# Patient Record
Sex: FEMALE | Race: WHITE | NOT HISPANIC OR LATINO | Employment: FULL TIME
[De-identification: names, ages, dates, MRNs, and addresses within clinical notes are randomized per-mention and may not be internally consistent; named-entity substitution may affect disease eponyms.]

---

## 2018-10-08 ENCOUNTER — RECORDS - HEALTHEAST (OUTPATIENT)
Dept: ADMINISTRATIVE | Facility: OTHER | Age: 28
End: 2018-10-08

## 2018-10-08 LAB
HPV SOURCE: NORMAL
HUMAN PAPILLOMA VIRUS 16 DNA: NEGATIVE
HUMAN PAPILLOMA VIRUS 18 DNA: NEGATIVE
HUMAN PAPILLOMA VIRUS FINAL DIAGNOSIS: NORMAL
HUMAN PAPILLOMA VIRUS OTHER HR: NEGATIVE
SPECIMEN DESCRIPTION: NORMAL

## 2018-10-15 ENCOUNTER — RECORDS - HEALTHEAST (OUTPATIENT)
Dept: ADMINISTRATIVE | Facility: OTHER | Age: 28
End: 2018-10-15

## 2019-07-25 ENCOUNTER — TELEPHONE (OUTPATIENT)
Dept: NEUROLOGY | Facility: CLINIC | Age: 29
End: 2019-07-25

## 2019-07-25 NOTE — TELEPHONE ENCOUNTER
AILYN for patient asking her to get records from anywhere she has been treat for her Migraines faxed to us ASAP for appointment 8/12/2019 with Dr. Palma.       Singh CARABALLO/RAYSA

## 2020-01-01 ENCOUNTER — TRANSFERRED RECORDS (OUTPATIENT)
Dept: HEALTH INFORMATION MANAGEMENT | Facility: CLINIC | Age: 30
End: 2020-01-01

## 2020-01-02 ENCOUNTER — AMBULATORY - HEALTHEAST (OUTPATIENT)
Dept: OTHER | Facility: CLINIC | Age: 30
End: 2020-01-02

## 2020-01-02 ENCOUNTER — NURSE TRIAGE (OUTPATIENT)
Dept: NURSING | Facility: CLINIC | Age: 30
End: 2020-01-02

## 2020-01-02 ENCOUNTER — DOCUMENTATION ONLY (OUTPATIENT)
Dept: OTHER | Facility: CLINIC | Age: 30
End: 2020-01-02

## 2020-01-02 NOTE — TELEPHONE ENCOUNTER
"Spouse calling reporting patient was in a sledding accident yesterday and hit her face. Patient was seen in [facility]. CT scan done. Diagnosed with concussion and facial fracture. Patient is not present. Placed call to patient directly. Patient stating her headache is a \"7\" on 1-10 pain scale. Reporting pain improves with Ibuprofen and Tylenol. Stating facial fracture is most painful. Patient is applying ice. Denies change in symptoms since seen in Emergency Room. Reviewed with patient if headache or concussion symptoms increased to return to Emergency Room.    Per guidelines reviewed home care. Caller verbalized understanding. Denies further questions.    Glory Mulligan RN  [facility]         Additional Information    Negative: Weakness (i.e., paralysis, loss of muscle strength) of the face, arm or leg on one side of the body    Negative: Loss of speech or garbled speech    Negative: Difficult to awaken or acting confused (e.g., disoriented, slurred speech)    Negative: Sounds like a life-threatening emergency to the triager    Negative: [1] Black eyes on both sides AND [2] onset within 24 hours of head injury     Patient stating she was advised to expect bruising under both eyes due to facial fracture.    Negative: [1] Concussion suspected AND [2] has not been examined by a HCP    Negative: [1] Mild traumatic brain injury (mTBI; concussion) AND [2] more than 14 days since head injury    Negative: Concussion symptoms are worsening    Negative: [1] Knocked out (unconscious) > 1 minute AND [2] no head CT Scan or MRI has been performed    Negative: [1] Vomiting once or more AND [2] no head CT Scan or MRI has been performed    Negative: [1] Unsteady on feet (e.g., unable to stand or requires support to walk) AND [2] no head CT Scan or MRI has been performed    Negative: [1] Neck pain after dangerous injury (e.g., MVA, diving, trampoline, contact sports, fall > 10 feet or 3 meters) AND [2] no neck " xray has been performed (e.g., c-spine xray or CT)    Negative: Patient sounds very sick or weak to the triager    Negative: [1] SEVERE headache (e.g., excruciating, pain scale 8-10) AND [2] not improved after pain medications    Negative: [1] Concussion symptoms SAME (not worse) AND [2]  taking Coumadin (warfarin) or other strong blood thinner, or known bleeding disorder (e.g., thrombocytopenia)    Negative: [1] Concussion symptoms SAME (not worse) AND [2]  known bleeding disorder (e.g., thrombocytopenia)    Negative: [1] Concussion symptoms staying SAME (not worse) AND [2] age > 60 years    Negative: [1] Concussion symptoms staying SAME (not worse or better) AND [2] present > 3 days    Negative: [1] Concussion symptoms getting BETTER (improving) BUT [2] present > 2 weeks    [1] Concussion symptoms staying SAME  (not worse or better) AND [2] present < 3 days    Protocols used: CONCUSSION (MTBI) LESS THAN 14 DAYS AGO FOLLOW-UP CALL-A-

## 2020-01-07 ENCOUNTER — TRANSFERRED RECORDS (OUTPATIENT)
Dept: HEALTH INFORMATION MANAGEMENT | Facility: CLINIC | Age: 30
End: 2020-01-07

## 2020-01-13 ENCOUNTER — PRE VISIT (OUTPATIENT)
Dept: NEUROLOGY | Facility: CLINIC | Age: 30
End: 2020-01-13

## 2020-01-13 NOTE — TELEPHONE ENCOUNTER
FUTURE VISIT INFORMATION      FUTURE VISIT INFORMATION:    Date: 1/13/2020    Time: 8AM    Location: [facility]  REFERRAL INFORMATION:    Referring provider:  Self     Referring providers clinic:      Reason for visit/diagnosis  [facility]      RECORDS REQUESTED FROM:       Clinic name Comments Records Status Imaging Status   [facility]  Requested  Requested    [facility]   Care Everywhere Requested CT head 1/1/2020

## 2020-01-27 ENCOUNTER — OFFICE VISIT (OUTPATIENT)
Dept: NEUROLOGY | Facility: CLINIC | Age: 30
End: 2020-01-27
Payer: COMMERCIAL

## 2020-01-27 VITALS
BODY MASS INDEX: 22.26 KG/M2 | OXYGEN SATURATION: 97 % | DIASTOLIC BLOOD PRESSURE: 77 MMHG | HEART RATE: 71 BPM | HEIGHT: 62 IN | WEIGHT: 121 LBS | RESPIRATION RATE: 15 BRPM | SYSTOLIC BLOOD PRESSURE: 116 MMHG

## 2020-01-27 DIAGNOSIS — G43.709 CHRONIC MIGRAINE WITHOUT AURA WITHOUT STATUS MIGRAINOSUS, NOT INTRACTABLE: Primary | ICD-10-CM

## 2020-01-27 RX ORDER — TOPIRAMATE 50 MG/1
TABLET, FILM COATED ORAL
COMMUNITY
Start: 2019-12-09 | End: 2020-05-01

## 2020-01-27 RX ORDER — RIZATRIPTAN BENZOATE 10 MG/1
TABLET ORAL
COMMUNITY
Start: 2019-09-07 | End: 2020-02-07

## 2020-01-27 ASSESSMENT — HEADACHE IMPACT TEST (HIT 6)
WHEN YOU HAVE A HEADACHE HOW OFTEN DO YOU WISH YOU COULD LIE DOWN: SOMETIMES
HOW OFTEN HAVE YOU FELT TOO TIRED TO WORK BECAUSE OF YOUR HEADACHES: RARELY
HOW OFTEN DO HEADACHES LIMIT YOUR DAILY ACTIVITIES: VERY OFTEN
HIT6 TOTAL SCORE: 60
HOW OFTEN DID HEADACHS LIMIT CONCENTRATION ON WORK OR DAILY ACTIVITY: SOMETIMES
HOW OFTEN HAVE YOU FELT FED UP OR IRRITATED BECAUSE OF YOUR HEADACHES: SOMETIMES
WHEN YOU HAVE HEADACHES HOW OFTEN IS THE PAIN SEVERE: VERY OFTEN

## 2020-01-27 ASSESSMENT — ENCOUNTER SYMPTOMS
NECK PAIN: 1
STIFFNESS: 0
JOINT SWELLING: 0
ARTHRALGIAS: 0
MUSCLE WEAKNESS: 0
BACK PAIN: 0
MUSCLE CRAMPS: 0
MYALGIAS: 1

## 2020-01-27 ASSESSMENT — MIFFLIN-ST. JEOR: SCORE: 1217.1

## 2020-01-27 ASSESSMENT — PAIN SCALES - GENERAL: PAINLEVEL: NO PAIN (0)

## 2020-01-27 NOTE — LETTER
1/27/2020       RE: Tessa Aly  [address] [id number]     Dear Colleague,    Thank you for referring your patient, Tessa Aly, to the [facility] NEUROLOGY at [facility]. Please see a copy of my visit note below.    [facility]   Clinics and Surgery Center  Neurology Consult     Tessa Aly MRN# [id number]   YOB: 1990 Age: 29 year old     Requesting physician: Dr. Sutherland         Assessment and Recommendations:   Tessa Aly is a 29 year old female who presents to the neurology clinic for further evaluation of headaches.    Her headache presentation is consistent with a long history of episodic migraine without aura, that worsened to become chronic after the birth of her second child 5 years ago.  She also had a recent sledding accident in early January, and her headaches initially increased after this, but have now returned to their previous baseline.  She denies other symptoms of concussion today.  Her neurologic exam is intact today.  Her outside CT scan did not show any intracranial pathology, but did show a right maxillary fracture.    Going forward, we discussed a symptomatic treatment strategy, focusing on prevention of chronic migraine.  -For acute treatment of headache, she would like to continue Excedrin Migraine as needed, not to exceed more than 9 days/month to avoid medication overuse.  -A trial of an alternative triptan could be considered in the future, as rizatriptan is no longer as effective as it used to be.  -For headache prevention, we discussed several options, including a trial of botulinum toxin injections using a chronic migraine protocol, or Emgality monthly subcutaneous injection.  She would like to consider these options and send me a my chart message or phone message.    I spent 45 minutes with the patient, over half of which was counseling.    America Palma MD  Neurology  Pager: [phone]  "           Chief Complaint:   Chief Complaint   Patient presents with     Headache     [facility] NEW HEADACHE- CHRONIC MIGRAINE           History is obtained from the patient and medical record.      Tessa Aly is a 29 year old female who has been suffering from headache attacks since menarche.  Her headaches were occasional initially, and gradually improved over time, until worsening after the birth of her second child 5 years ago.  They have been frequent and severe ever since.  She currently has headaches \"every day to every other day\", and at least 2 or 3 times each week.  On average, she has at least 15 headache days a month for the last few years.    Her headaches are worst on the right, with \"really bad, intense\" retro-orbital pain.  Headache attacks can rate between a 4 and a 9 out of 10, and average about a 7 or 8 out of 10.  When a headache attack occurs, it will last all day.  Her headaches are associated with sensitivity to lights and sounds.  She can also have nausea.  She denies vomiting.  She denies typical aura, positional component to her pain, autonomic features, fevers, or other illness associated with headache.      Of note, earlier this month, she did have a sledding accident resulting in a right maxillary fracture and right supraorbital superficial hematoma.  Her headaches temporarily increased for a few days or weeks, but have since returned to their previous baseline.  She denies other symptoms, including associated balance difficulty, cognitive difficulty, mood changes, sleep difficulty.    When the headache is present, she will sit down on her couch and try to relax, if she can.  She is able to work from home, so rarely has to miss work.    For treatment, she takes Excedrin Migraine, and is found this to be the most effective treatment for her.  She will try not to use this until the headache is an 8 or 9 out of 10, and limits this use to no more than once a week.  She also has rizatriptan 10 mg " available to her, which is no longer effective.    For headache prevention, she currently takes topiramate 50 mg twice a day, which she has been on since August 2019.  This is not currently effective.  In the past, she has tried nortriptyline which caused dry mouth and lost efficacy after a few months.  She is also tried propranolol, which also lost efficacy after a few months.            Past Medical History:   She denies other medical diagnoses.          Past Surgical History:   Tumor removed from left arm          Social History:   She is  and has 2 biological children.  She works as a  at , and sometimes works from home.  Social History     Socioeconomic History     Marital status:      Spouse name: Not on file     Number of children: Not on file     Years of education: Not on file     Highest education level: Not on file   Occupational History     Not on file   Social Needs     Financial resource strain: Not on file     Food insecurity:     Worry: Not on file     Inability: Not on file     Transportation needs:     Medical: Not on file     Non-medical: Not on file   Tobacco Use     Smoking status: Never Smoker     Smokeless tobacco: Never Used   Substance and Sexual Activity     Alcohol use: Not Currently     Drug use: Not on file     Sexual activity: Not on file   Lifestyle     Physical activity:     Days per week: Not on file     Minutes per session: Not on file     Stress: Not on file   Relationships     Social connections:     Talks on phone: Not on file     Gets together: Not on file     Attends Muslim service: Not on file     Active member of club or organization: Not on file     Attends meetings of clubs or organizations: Not on file     Relationship status: Not on file     Intimate partner violence:     Fear of current or ex partner: Not on file     Emotionally abused: Not on file     Physically abused: Not on file     Forced sexual activity: Not on file   Other  "Topics Concern     Not on file   Social History Narrative     Not on file    caffeine one or two sodas daily          Family History:   Maternal aunt with migraines          Allergies:    No Known Allergies          Medications:     Current Outpatient Medications:      aspirin-acetaminophen-caffeine (EXCEDRIN MIGRAINE) 250-250-65 MG tablet, Take 1 tablet by mouth every 6 hours as needed for headaches, Disp: , Rfl:      folic acid 5 MG CAPS, TAKE 1 CAPSULE BY MOUTH EVERY DAY, Disp: , Rfl:      rizatriptan (MAXALT) 10 MG tablet, , Disp: , Rfl:      topiramate (TOPAMAX) 50 MG tablet, , Disp: , Rfl:             Physical Exam:   /77   Pulse 71   Resp 15   Ht 1.575 m (5' 2\")   Wt 54.9 kg (121 lb)   SpO2 97%   BMI 22.13 kg/m      Physical Exam:   General: NAD  Neurologic:   Mental Status Exam: Alert, awake and oriented to situation. No dysarthria. Speech of normal fluency.   Cranial Nerves: Fundoscopic exam with clear disc margins bilaterally. PERRLA, EOMs intact, no nystagmus, facial movements symmetric, facial sensation intact to light touch, hearing intact to conversation, trapezius and SCMs 5/5 bilaterally, tongue midline and fully mobile. No tongue atrophy or fasciculations.    Motor: Normal tone in all four extremities, no atrophy or fasciculations. 5/5 strength bilaterally in shoulder abduction, elbow extensors and flexors, wrist extensors and flexors, hip flexors, knee extensors and flexors, dorsi- and plantarflexion. No tremors or abnormal movements noted.   Sensory: Sensation intact to light touch on arms and legs bilaterally.    Coordination: Finger-nose-finger intact bilaterally.  Rapidly alternating movements intact bilaterally in the upper extremities.  Normal finger tapping bilaterally.  Normal Romberg.   Reflexes: 3+ and symmetric in triceps, biceps, brachioradialis, patellar, Achilles, and plantars downgoing bilaterally.   Gait: Normal gait.  Able to toe and heel walk.  Tandem gait " normal.  Head: Normocephalic, atraumatic. No radiating pain with palpation over the supraorbital notches, occipital nerves.  Temporal pulses intact.  Tenderness to palpation over the right occipital area, from previous contusion.  Neck: Normal range of motion with lateral head movements and neck flexion.  Eyes: No conjunctival injection, no scleral icterus.   Respiratory: No increased work of breathing.  Cardiovascular: No lower extremity edema.  Extremities: Warm, dry.          Data:     CT of the head and facial bones without contrast (January 1, 2020): Per outside report, right frontal scalp contusion, soft tissue contusion superficial to the nasal bones, and acute fracture of the right frontal process of the maxilla and mild buckling    Again, thank you for allowing me to participate in the care of your patient.      Sincerely,    America Palma MD

## 2020-01-27 NOTE — PROGRESS NOTES
[facility]  Neurology Consult     Tessa Aly MRN# [id number]   YOB: 1990 Age: 29 year old     Requesting physician: Dr. Sutherland         Assessment and Recommendations:   Tessa Aly is a 29 year old female who presents to the neurology clinic for further evaluation of headaches.    Her headache presentation is consistent with a long history of episodic migraine without aura, that worsened to become chronic after the birth of her second child 5 years ago.  She also had a recent sledding accident in early January, and her headaches initially increased after this, but have now returned to their previous baseline.  She denies other symptoms of concussion today.  Her neurologic exam is intact today.  Her outside CT scan did not show any intracranial pathology, but did show a right maxillary fracture.    Going forward, we discussed a symptomatic treatment strategy, focusing on prevention of chronic migraine.  -For acute treatment of headache, she would like to continue Excedrin Migraine as needed, not to exceed more than 9 days/month to avoid medication overuse.  -A trial of an alternative triptan could be considered in the future, as rizatriptan is no longer as effective as it used to be.  -For headache prevention, we discussed several options, including a trial of botulinum toxin injections using a chronic migraine protocol, or Emgality monthly subcutaneous injection.  She would like to consider these options and send me a my chart message or phone message.    I spent 45 minutes with the patient, over half of which was counseling.    America Palma MD  Neurology  Pager: [phone]            Chief Complaint:   Chief Complaint   Patient presents with     Headache     [facility] NEW HEADACHE- CHRONIC MIGRAINE           History is obtained from the patient and medical record.      Tessa Aly is a 29 year old female who has been suffering from headache attacks  "since menarche.  Her headaches were occasional initially, and gradually improved over time, until worsening after the birth of her second child 5 years ago.  They have been frequent and severe ever since.  She currently has headaches \"every day to every other day\", and at least 2 or 3 times each week.  On average, she has at least 15 headache days a month for the last few years.    Her headaches are worst on the right, with \"really bad, intense\" retro-orbital pain.  Headache attacks can rate between a 4 and a 9 out of 10, and average about a 7 or 8 out of 10.  When a headache attack occurs, it will last all day.  Her headaches are associated with sensitivity to lights and sounds.  She can also have nausea.  She denies vomiting.  She denies typical aura, positional component to her pain, autonomic features, fevers, or other illness associated with headache.      Of note, earlier this month, she did have a sledding accident resulting in a right maxillary fracture and right supraorbital superficial hematoma.  Her headaches temporarily increased for a few days or weeks, but have since returned to their previous baseline.  She denies other symptoms, including associated balance difficulty, cognitive difficulty, mood changes, sleep difficulty.    When the headache is present, she will sit down on her couch and try to relax, if she can.  She is able to work from home, so rarely has to miss work.    For treatment, she takes Excedrin Migraine, and is found this to be the most effective treatment for her.  She will try not to use this until the headache is an 8 or 9 out of 10, and limits this use to no more than once a week.  She also has rizatriptan 10 mg available to her, which is no longer effective.    For headache prevention, she currently takes topiramate 50 mg twice a day, which she has been on since August 2019.  This is not currently effective.  In the past, she has tried nortriptyline which caused dry mouth and lost " efficacy after a few months.  She is also tried propranolol, which also lost efficacy after a few months.            Past Medical History:   She denies other medical diagnoses.          Past Surgical History:   Tumor removed from left arm          Social History:   She is  and has 2 biological children.  She works as a  at , and sometimes works from home.  Social History     Socioeconomic History     Marital status:      Spouse name: Not on file     Number of children: Not on file     Years of education: Not on file     Highest education level: Not on file   Occupational History     Not on file   Social Needs     Financial resource strain: Not on file     Food insecurity:     Worry: Not on file     Inability: Not on file     Transportation needs:     Medical: Not on file     Non-medical: Not on file   Tobacco Use     Smoking status: Never Smoker     Smokeless tobacco: Never Used   Substance and Sexual Activity     Alcohol use: Not Currently     Drug use: Not on file     Sexual activity: Not on file   Lifestyle     Physical activity:     Days per week: Not on file     Minutes per session: Not on file     Stress: Not on file   Relationships     Social connections:     Talks on phone: Not on file     Gets together: Not on file     Attends Sabianist service: Not on file     Active member of club or organization: Not on file     Attends meetings of clubs or organizations: Not on file     Relationship status: Not on file     Intimate partner violence:     Fear of current or ex partner: Not on file     Emotionally abused: Not on file     Physically abused: Not on file     Forced sexual activity: Not on file   Other Topics Concern     Not on file   Social History Narrative     Not on file    caffeine one or two sodas daily          Family History:   Maternal aunt with migraines          Allergies:    No Known Allergies          Medications:     Current Outpatient Medications:       "aspirin-acetaminophen-caffeine (EXCEDRIN MIGRAINE) 250-250-65 MG tablet, Take 1 tablet by mouth every 6 hours as needed for headaches, Disp: , Rfl:      folic acid 5 MG CAPS, TAKE 1 CAPSULE BY MOUTH EVERY DAY, Disp: , Rfl:      rizatriptan (MAXALT) 10 MG tablet, , Disp: , Rfl:      topiramate (TOPAMAX) 50 MG tablet, , Disp: , Rfl:           Review of Systems:   Answers for HPI/ROS submitted by the patient on 1/27/2020   General Symptoms: No  Skin Symptoms: No  HENT Symptoms: No  EYE SYMPTOMS: No  HEART SYMPTOMS: No  LUNG SYMPTOMS: No  INTESTINAL SYMPTOMS: No  URINARY SYMPTOMS: No  GYNECOLOGIC SYMPTOMS: No  BREAST SYMPTOMS: No  SKELETAL SYMPTOMS: Yes  BLOOD SYMPTOMS: No  NERVOUS SYSTEM SYMPTOMS: No  MENTAL HEALTH SYMPTOMS: No  Back pain: No  Muscle aches: Yes  Neck pain: Yes  Swollen joints: No  Joint pain: No  Bone pain: No  Muscle cramps: No  Muscle weakness: No  Joint stiffness: No  Bone fracture: Yes          Physical Exam:   /77   Pulse 71   Resp 15   Ht 1.575 m (5' 2\")   Wt 54.9 kg (121 lb)   SpO2 97%   BMI 22.13 kg/m     Physical Exam:   General: NAD  Neurologic:   Mental Status Exam: Alert, awake and oriented to situation. No dysarthria. Speech of normal fluency.   Cranial Nerves: Fundoscopic exam with clear disc margins bilaterally. PERRLA, EOMs intact, no nystagmus, facial movements symmetric, facial sensation intact to light touch, hearing intact to conversation, trapezius and SCMs 5/5 bilaterally, tongue midline and fully mobile. No tongue atrophy or fasciculations.    Motor: Normal tone in all four extremities, no atrophy or fasciculations. 5/5 strength bilaterally in shoulder abduction, elbow extensors and flexors, wrist extensors and flexors, hip flexors, knee extensors and flexors, dorsi- and plantarflexion. No tremors or abnormal movements noted.   Sensory: Sensation intact to light touch on arms and legs bilaterally.    Coordination: Finger-nose-finger intact bilaterally.  Rapidly " alternating movements intact bilaterally in the upper extremities.  Normal finger tapping bilaterally.  Normal Romberg.   Reflexes: 3+ and symmetric in triceps, biceps, brachioradialis, patellar, Achilles, and plantars downgoing bilaterally.   Gait: Normal gait.  Able to toe and heel walk.  Tandem gait normal.  Head: Normocephalic, atraumatic. No radiating pain with palpation over the supraorbital notches, occipital nerves.  Temporal pulses intact.  Tenderness to palpation over the right occipital area, from previous contusion.  Neck: Normal range of motion with lateral head movements and neck flexion.  Eyes: No conjunctival injection, no scleral icterus.   Respiratory: No increased work of breathing.  Cardiovascular: No lower extremity edema.  Extremities: Warm, dry.          Data:     CT of the head and facial bones without contrast (January 1, 2020): Per outside report, right frontal scalp contusion, soft tissue contusion superficial to the nasal bones, and acute fracture of the right frontal process of the maxilla and mild buckling

## 2020-01-30 DIAGNOSIS — G43.709 CHRONIC MIGRAINE WITHOUT AURA WITHOUT STATUS MIGRAINOSUS, NOT INTRACTABLE: Primary | ICD-10-CM

## 2020-02-07 DIAGNOSIS — G43.709 CHRONIC MIGRAINE WITHOUT AURA WITHOUT STATUS MIGRAINOSUS, NOT INTRACTABLE: Primary | ICD-10-CM

## 2020-02-07 RX ORDER — RIZATRIPTAN BENZOATE 10 MG/1
10 TABLET ORAL
Qty: 18 TABLET | Refills: 3 | Status: SHIPPED | OUTPATIENT
Start: 2020-02-07 | End: 2020-06-08

## 2020-03-09 ENCOUNTER — OFFICE VISIT (OUTPATIENT)
Dept: NEUROLOGY | Facility: CLINIC | Age: 30
End: 2020-03-09
Payer: COMMERCIAL

## 2020-03-09 DIAGNOSIS — G43.709 CHRONIC MIGRAINE WITHOUT AURA WITHOUT STATUS MIGRAINOSUS, NOT INTRACTABLE: Primary | ICD-10-CM

## 2020-03-09 ASSESSMENT — HEADACHE IMPACT TEST (HIT 6)
HOW OFTEN HAVE YOU FELT TOO TIRED TO WORK BECAUSE OF YOUR HEADACHES: SOMETIMES
HOW OFTEN HAVE YOU FELT FED UP OR IRRITATED BECAUSE OF YOUR HEADACHES: VERY OFTEN
HIT6 TOTAL SCORE: 65
WHEN YOU HAVE HEADACHES HOW OFTEN IS THE PAIN SEVERE: VERY OFTEN
HOW OFTEN DID HEADACHS LIMIT CONCENTRATION ON WORK OR DAILY ACTIVITY: VERY OFTEN
WHEN YOU HAVE A HEADACHE HOW OFTEN DO YOU WISH YOU COULD LIE DOWN: VERY OFTEN
HOW OFTEN DO HEADACHES LIMIT YOUR DAILY ACTIVITIES: VERY OFTEN

## 2020-03-09 NOTE — LETTER
3/9/2020       RE: Tessa Aly  [address] [facility] [address]     Dear Colleague,    Thank you for referring your patient, Tessa Aly, to the [facility] NEUROLOGY at [facility]. Please see a copy of my visit note below.    [place], [place]  Botulinum Toxin Procedure    America Palma MD  Neurology    March 9, 2020    Procedure:  OnabotulinumtoxinA injections for chronic migraine  Indication:  Chronic migraine    Ms. Aly suffers from severe intractable headaches.  She was referred by Dr. Palma for onabotulinumtoxinA injections for headache.  Risks, benefits, and alternatives were discussed.  All questions were answered and consent given.  She decided to proceed with the injections.     Prior to initiation of botulinum toxin injections, Ms. Aly reported 15-30 headache days per month, with 15-30 severe headache days per month. Her headaches are quite disabling and often interfere with her ability to function normally.    For headache prevention, she currently takes topiramate 50 mg twice a day, which she has been on since August 2019.  This is not currently effective.  In the past, she has tried nortriptyline which caused dry mouth and lost efficacy after a few months.  She is also tried propranolol, which also lost efficacy after a few months.    She currently takes topiramate for prevention.    Ms. Aly's pain was assessed prior to the procedure.  She rated her pain today as 4 out of 10.    Procedural Pause: Procedural pause was conducted to verify correct patient identity, procedure to be performed, correct side and site, correct patient position, and special requirements. Appropriate hand hygiene was utilized, and each injection site was prepped with alcohol wipes or Chloraprep swab.     Procedure Details: From lot number [id number], expiration date August 2022, 200 units of onabotulinumtoxinA was diluted in 4 mL 0.9% normal saline,  lot #-[id number], expiration date March 1, 2021. A total of 150 units of onabotulinumtoxinA were injected using 30 gauge 0.5 in needles into the muscles listed below. 50 units of onabotulinumtoxinA were wasted.     Injection Sites: Total = 150 units onabotulinumtoxinA      and Procerus muscles - 5 units into the left and right corrugators and 5 units into the procerus (15 units total)    Frontalis muscles - 5 units into the left superior frontalis and 5 unites into the right superior frontalis (2 injection sites per muscle) (10 units total)    Temporalis muscles - 12.5 units into the left temporalis muscle and 12.5 units into the right temporalis muscle (2 injection sites per muscle) (25 units total)    Occipitalis muscles - 12.5 units into the left occipitalis muscle and 12.5 units into the right occipitalis muscle (2 injection sites per muscle) (25 units total)    Splenius Capitis muscles - 12.5 units into the left splenius capitis muscle and 12.5 units into the right splenius capitis muscle (2 injection sites per muscle, divided into 2/3 anteriorly and 1/3 posteriorly) (25 units total)      Trapezius muscles - 25 units into the left trapezius muscle and 25 units into the right trapezius muscle (3 injection sites per muscle, divided 5 units, 10 units, 10 units, medial to  lateral) (50 units total)    Ms. Aly tolerated the procedure well without immediate complications.  She will follow up in clinic for assessment of the effectiveness of treatment.  She did not report any change in her pain level after the botulinumtoxinA injection procedure.    America Palma MD  Pager: [phone]    Neurology Department  [facility]  [address] [place] [id number]

## 2020-03-09 NOTE — PROGRESS NOTES
[place], [place]  Botulinum Toxin Procedure    America Palma MD  Neurology    March 9, 2020    Procedure:  OnabotulinumtoxinA injections for chronic migraine  Indication:  Chronic migraine    Ms. Aly suffers from severe intractable headaches.  She was referred by Dr. Palma for onabotulinumtoxinA injections for headache.  Risks, benefits, and alternatives were discussed.  All questions were answered and consent given.  She decided to proceed with the injections.     Prior to initiation of botulinum toxin injections, Ms. Aly reported 15-30 headache days per month, with 15-30 severe headache days per month. Her headaches are quite disabling and often interfere with her ability to function normally.    For headache prevention, she currently takes topiramate 50 mg twice a day, which she has been on since August 2019.  This is not currently effective.  In the past, she has tried nortriptyline which caused dry mouth and lost efficacy after a few months.  She is also tried propranolol, which also lost efficacy after a few months.    She currently takes topiramate for prevention.    Ms. Aly's pain was assessed prior to the procedure.  She rated her pain today as 4 out of 10.    Procedural Pause: Procedural pause was conducted to verify correct patient identity, procedure to be performed, correct side and site, correct patient position, and special requirements. Appropriate hand hygiene was utilized, and each injection site was prepped with alcohol wipes or Chloraprep swab.     Procedure Details: From lot number [id number], expiration date August 2022, 200 units of onabotulinumtoxinA was diluted in 4 mL 0.9% normal saline, lot #-[id number], expiration date March 1, 2021. A total of 150 units of onabotulinumtoxinA were injected using 30 gauge 0.5 in needles into the muscles listed below. 50 units of onabotulinumtoxinA were wasted.     Injection Sites: Total = 150 units onabotulinumtoxinA       and Procerus muscles - 5 units into the left and right corrugators and 5 units into the procerus (15 units total)    Frontalis muscles - 5 units into the left superior frontalis and 5 unites into the right superior frontalis (2 injection sites per muscle) (10 units total)    Temporalis muscles - 12.5 units into the left temporalis muscle and 12.5 units into the right temporalis muscle (2 injection sites per muscle) (25 units total)    Occipitalis muscles - 12.5 units into the left occipitalis muscle and 12.5 units into the right occipitalis muscle (2 injection sites per muscle) (25 units total)    Splenius Capitis muscles - 12.5 units into the left splenius capitis muscle and 12.5 units into the right splenius capitis muscle (2 injection sites per muscle, divided into 2/3 anteriorly and 1/3 posteriorly) (25 units total)      Trapezius muscles - 25 units into the left trapezius muscle and 25 units into the right trapezius muscle (3 injection sites per muscle, divided 5 units, 10 units, 10 units, medial to  lateral) (50 units total)    Ms. Aly tolerated the procedure well without immediate complications.  She will follow up in clinic for assessment of the effectiveness of treatment.  She did not report any change in her pain level after the botulinumtoxinA injection procedure.    America Palma MD  Pager: [phone]    Neurology Department  [facility]  Clinics and Surgery [address] [address]

## 2020-03-11 ENCOUNTER — HEALTH MAINTENANCE LETTER (OUTPATIENT)
Age: 30
End: 2020-03-11

## 2020-05-01 DIAGNOSIS — G43.709 CHRONIC MIGRAINE WITHOUT AURA WITHOUT STATUS MIGRAINOSUS, NOT INTRACTABLE: Primary | ICD-10-CM

## 2020-05-01 RX ORDER — TOPIRAMATE 50 MG/1
50 TABLET, FILM COATED ORAL 2 TIMES DAILY
Qty: 60 TABLET | Refills: 11 | Status: SHIPPED | OUTPATIENT
Start: 2020-05-01 | End: 2021-03-24

## 2020-06-08 ENCOUNTER — OFFICE VISIT (OUTPATIENT)
Dept: NEUROLOGY | Facility: CLINIC | Age: 30
End: 2020-06-08
Payer: COMMERCIAL

## 2020-06-08 DIAGNOSIS — G43.709 CHRONIC MIGRAINE WITHOUT AURA WITHOUT STATUS MIGRAINOSUS, NOT INTRACTABLE: Primary | ICD-10-CM

## 2020-06-08 RX ORDER — RIZATRIPTAN BENZOATE 10 MG/1
10 TABLET ORAL
Qty: 18 TABLET | Refills: 11 | Status: SHIPPED | OUTPATIENT
Start: 2020-06-08 | End: 2021-03-24

## 2020-06-08 ASSESSMENT — HEADACHE IMPACT TEST (HIT 6)
WHEN YOU HAVE A HEADACHE HOW OFTEN DO YOU WISH YOU COULD LIE DOWN: VERY OFTEN
HIT6 TOTAL SCORE: 66
HOW OFTEN DO HEADACHES LIMIT YOUR DAILY ACTIVITIES: VERY OFTEN
HOW OFTEN HAVE YOU FELT TOO TIRED TO WORK BECAUSE OF YOUR HEADACHES: VERY OFTEN
HOW OFTEN HAVE YOU FELT FED UP OR IRRITATED BECAUSE OF YOUR HEADACHES: VERY OFTEN
WHEN YOU HAVE HEADACHES HOW OFTEN IS THE PAIN SEVERE: VERY OFTEN
HOW OFTEN DID HEADACHS LIMIT CONCENTRATION ON WORK OR DAILY ACTIVITY: VERY OFTEN

## 2020-06-08 NOTE — LETTER
6/8/2020       RE: Tessa Aly  [address] [facility] [address]     Dear Colleague,    Thank you for referring your patient, Tessa Aly, to the [facility] NEUROLOGY at [facility]. Please see a copy of my visit note below.    [place], [place]  Botulinum Toxin Procedure    America Palma MD  Neurology    June 8, 2020    Procedure:  OnabotulinumtoxinA injections for chronic migraine  Indication:  Chronic migraine    Ms. Aly suffers from severe intractable headaches.  She was referred by Dr. Palma for onabotulinumtoxinA injections for headache.  Risks, benefits, and alternatives were discussed.  All questions were answered and consent given.  She decided to proceed with the injections.     Prior to initiation of botulinum toxin injections, Ms. Aly reported 15-30 headache days per month, with 15-30 severe headache days per month. Her headaches are quite disabling and often interfere with her ability to function normally.    Ms. Aly reports 4 headache days per month currently, with 4 severe headache days per month.  She has noticed a wearing off phenomenon prior to this round of botulinum toxin injections, lasting 4 weeks.    For headache prevention, she currently takes topiramate 50 mg twice a day, which she has been on since August 2019.  In the past, she has tried nortriptyline which caused dry mouth and lost efficacy after a few months.  She also tried propranolol, which also lost efficacy after a few months.     She currently takes topiramate for prevention.    Ms. Aly's pain was assessed prior to the procedure.  She rated her pain today as 3 out of 10.    Procedural Pause: Procedural pause was conducted to verify correct patient identity, procedure to be performed, correct side and site, correct patient position, and special requirements. Appropriate hand hygiene was utilized, and each injection site was prepped with alcohol wipes  or Chloraprep swab.     Procedure Details: From lot number [id number], expiration date 11/2022, 200 units of onabotulinumtoxinA was diluted in 4 mL 0.9% normal saline, lot # -[id number]. A total of 150 units of onabotulinumtoxinA were injected using 30 gauge 0.5 in needles into the muscles listed below. 50 units of onabotulinumtoxinA were wasted.     Injection Sites: Total = 150 units onabotulinumtoxinA      and Procerus muscles - 5 units into the left and right corrugators and 5 units into the procerus (15 units total)    Frontalis muscles - 5 units into the left superior frontalis and 5 units into the right superior frontalis (2 injection sites per muscle) (10 units total)    Temporalis muscles - 12.5 units into the left temporalis muscle and 12.5 units into the right temporalis muscle (2 injection sites per muscle) (25 units total)    Occipitalis muscles - 12.5 units into the left occipitalis muscle and 12.5 units into the right occipitalis muscle (2 injection sites per muscle) (25 units total)    Splenius Capitis muscles - 12.5 units into the left splenius capitis muscle and 12.5 units into the right splenius capitis muscle (2 injection sites per muscle, divided into 2/3 anteriorly and 1/3 posteriorly) (25 units total)      Trapezius muscles - 25 units into the left trapezius muscle and 25 units into the right trapezius muscle (3 injection sites per muscle, divided 5 units, 10 units, 10 units, medial to lateral) (50 units total)    Ms. Aly tolerated the procedure well without immediate complications.  She will follow up in clinic for assessment of the effectiveness of treatment.  She did not report any change in her pain level after the botulinumtoxinA injection procedure.    America Palma MD  Pager: [phone]    Neurology Department  [facility] [address] [address]

## 2020-06-08 NOTE — PROGRESS NOTES
[place], [place]  Botulinum Toxin Procedure    America Palma MD  Neurology    June 8, 2020    Procedure:  OnabotulinumtoxinA injections for chronic migraine  Indication:  Chronic migraine    Ms. Aly suffers from severe intractable headaches.  She was referred by Dr. Palma for onabotulinumtoxinA injections for headache.  Risks, benefits, and alternatives were discussed.  All questions were answered and consent given.  She decided to proceed with the injections.     Prior to initiation of botulinum toxin injections, Ms. Aly reported 15-30 headache days per month, with 15-30 severe headache days per month. Her headaches are quite disabling and often interfere with her ability to function normally.    Ms. Aly reports 4 headache days per month currently, with 4 severe headache days per month.  She has noticed a wearing off phenomenon prior to this round of botulinum toxin injections, lasting 4 weeks.    For headache prevention, she currently takes topiramate 50 mg twice a day, which she has been on since August 2019.  In the past, she has tried nortriptyline which caused dry mouth and lost efficacy after a few months.  She also tried propranolol, which also lost efficacy after a few months.     She currently takes topiramate for prevention.    Ms. Aly's pain was assessed prior to the procedure.  She rated her pain today as 3 out of 10.    Procedural Pause: Procedural pause was conducted to verify correct patient identity, procedure to be performed, correct side and site, correct patient position, and special requirements. Appropriate hand hygiene was utilized, and each injection site was prepped with alcohol wipes or Chloraprep swab.     Procedure Details: From lot number [id number], expiration date 11/2022, 200 units of onabotulinumtoxinA was diluted in 4 mL 0.9% normal saline, lot # -[id number]. A total of 150 units of onabotulinumtoxinA were injected using 30 gauge 0.5 in needles into  the muscles listed below. 50 units of onabotulinumtoxinA were wasted.     Injection Sites: Total = 150 units onabotulinumtoxinA      and Procerus muscles - 5 units into the left and right corrugators and 5 units into the procerus (15 units total)    Frontalis muscles - 5 units into the left superior frontalis and 5 units into the right superior frontalis (2 injection sites per muscle) (10 units total)    Temporalis muscles - 12.5 units into the left temporalis muscle and 12.5 units into the right temporalis muscle (2 injection sites per muscle) (25 units total)    Occipitalis muscles - 12.5 units into the left occipitalis muscle and 12.5 units into the right occipitalis muscle (2 injection sites per muscle) (25 units total)    Splenius Capitis muscles - 12.5 units into the left splenius capitis muscle and 12.5 units into the right splenius capitis muscle (2 injection sites per muscle, divided into 2/3 anteriorly and 1/3 posteriorly) (25 units total)      Trapezius muscles - 25 units into the left trapezius muscle and 25 units into the right trapezius muscle (3 injection sites per muscle, divided 5 units, 10 units, 10 units, medial to lateral) (50 units total)    Ms. Aly tolerated the procedure well without immediate complications.  She will follow up in clinic for assessment of the effectiveness of treatment.  She did not report any change in her pain level after the botulinumtoxinA injection procedure.    America Palma MD  Pager: [phone]    Neurology Department  [facility] [place] [id number]

## 2020-09-14 ENCOUNTER — OFFICE VISIT (OUTPATIENT)
Dept: NEUROLOGY | Facility: CLINIC | Age: 30
End: 2020-09-14
Payer: COMMERCIAL

## 2020-09-14 DIAGNOSIS — G43.709 CHRONIC MIGRAINE WITHOUT AURA WITHOUT STATUS MIGRAINOSUS, NOT INTRACTABLE: Primary | ICD-10-CM

## 2020-09-14 ASSESSMENT — HEADACHE IMPACT TEST (HIT 6)
HIT6 TOTAL SCORE: 68
HOW OFTEN DO HEADACHES LIMIT YOUR DAILY ACTIVITIES: VERY OFTEN
WHEN YOU HAVE A HEADACHE HOW OFTEN DO YOU WISH YOU COULD LIE DOWN: VERY OFTEN
HOW OFTEN HAVE YOU FELT TOO TIRED TO WORK BECAUSE OF YOUR HEADACHES: VERY OFTEN
HOW OFTEN HAVE YOU FELT FED UP OR IRRITATED BECAUSE OF YOUR HEADACHES: ALWAYS
WHEN YOU HAVE HEADACHES HOW OFTEN IS THE PAIN SEVERE: VERY OFTEN
HOW OFTEN DID HEADACHS LIMIT CONCENTRATION ON WORK OR DAILY ACTIVITY: VERY OFTEN

## 2020-09-14 NOTE — PROGRESS NOTES
[place], [place]  Botulinum Toxin Procedure    America Palma MD  Neurology    September 14, 2020    Procedure:  OnabotulinumtoxinA injections for chronic migraine  Indication:  Chronic migraine    HIT-6: 68    Ms. Aly suffers from severe intractable headaches.  She was referred by Dr. Palma for onabotulinumtoxinA injections for headache.  Risks, benefits, and alternatives were discussed.  All questions were answered and consent given.  She decided to proceed with the injections.     Prior to initiation of botulinum toxin injections, Ms. Aly reported 15-30 headache days per month, with 15-30 severe headache days per month. Her headaches are quite disabling and often interfere with her ability to function normally.    Ms. Aly reports 15 headache days per month currently, with 12 severe headache days per month.  She has noticed a wearing off phenomenon prior to this round of botulinum toxin injections, lasting a few weeks.    Menstrual period and stress are triggers.     For headache prevention, she currently takes topiramate 50 mg twice a day, which she has been on since August 2019.  In the past, she has tried nortriptyline which caused dry mouth and lost efficacy after a few months.  She also tried propranolol, which also lost efficacy after a few months.     She currently takes topiramate for prevention.     Ms. Aly's pain was assessed prior to the procedure.  She rated her pain today as 7 out of 10.    Procedural Pause: Procedural pause was conducted to verify correct patient identity, procedure to be performed, correct side and site, correct patient position, and special requirements. Appropriate hand hygiene was utilized, and each injection site was prepped with alcohol wipes or Chloraprep swab.     Procedure Details: From lot number [id number], expiration date 5/2023, 200 units of onabotulinumtoxinA was diluted in 4 mL 0.9% normal saline, lot # [id number]. A total of 150 units of  onabotulinumtoxinA were injected using 30 gauge 0.5 in needles into the muscles listed below. 50 units of onabotulinumtoxinA were wasted.     Injection Sites: Total = 150 units onabotulinumtoxinA      and Procerus muscles - 5 units into the left and right corrugators and 5 units into the procerus (15 units total)    Frontalis muscles - 5 units into the left superior frontalis and 5 units into the right superior frontalis (2 injection sites per muscle) (10 units total)    Temporalis muscles - 12.5 units into the left temporalis muscle and 12.5 units into the right temporalis muscle (2 injection sites per muscle) (25 units total)    Occipitalis muscles - 12.5 units into the left occipitalis muscle and 12.5 units into the right occipitalis muscle (2 injection sites per muscle) (25 units total)    Splenius Capitis muscles - 12.5 units into the left splenius capitis muscle and 12.5 units into the right splenius capitis muscle (2 injection sites per muscle, divided into 2/3 anteriorly and 1/3 posteriorly) (25 units total)      Trapezius muscles - 25 units into the left trapezius muscle and 25 units into the right trapezius muscle (3 injection sites per muscle, divided 5 units, 10 units, 10 units, medial to lateral) (50 units total)    Ms. Aly tolerated the procedure well without immediate complications.  She will follow up in clinic for assessment of the effectiveness of treatment.  She did not report any change in her pain level after the botulinumtoxinA injection procedure.    America Palma MD  Pager: [phone]    Neurology Department  [facility] [address] [address]

## 2020-09-14 NOTE — LETTER
9/14/2020       RE: Tessa Aly  [address] [facility] [address]     Dear Colleague,    Thank you for referring your patient, Tessa Aly, to the [facility] NEUROLOGY at [facility]. Please see a copy of my visit note below.    [place], [place]  Botulinum Toxin Procedure    America Palma MD  Neurology    September 14, 2020    Procedure:  OnabotulinumtoxinA injections for chronic migraine  Indication:  Chronic migraine    HIT-6: 68    Ms. Aly suffers from severe intractable headaches.  She was referred by Dr. Palma for onabotulinumtoxinA injections for headache.  Risks, benefits, and alternatives were discussed.  All questions were answered and consent given.  She decided to proceed with the injections.     Prior to initiation of botulinum toxin injections, Ms. Aly reported 15-30 headache days per month, with 15-30 severe headache days per month. Her headaches are quite disabling and often interfere with her ability to function normally.    Ms. Aly reports 15 headache days per month currently, with 12 severe headache days per month.  She has noticed a wearing off phenomenon prior to this round of botulinum toxin injections, lasting a few weeks.    Menstrual period and stress are triggers.     For headache prevention, she currently takes topiramate 50 mg twice a day, which she has been on since August 2019.  In the past, she has tried nortriptyline which caused dry mouth and lost efficacy after a few months.  She also tried propranolol, which also lost efficacy after a few months.     She currently takes topiramate for prevention.     Ms. Aly's pain was assessed prior to the procedure.  She rated her pain today as 7 out of 10.    Procedural Pause: Procedural pause was conducted to verify correct patient identity, procedure to be performed, correct side and site, correct patient position, and special requirements. Appropriate hand  hygiene was utilized, and each injection site was prepped with alcohol wipes or Chloraprep swab.     Procedure Details: From lot number [id number], expiration date 5/2023, 200 units of onabotulinumtoxinA was diluted in 4 mL 0.9% normal saline, lot # [id number]. A total of 150 units of onabotulinumtoxinA were injected using 30 gauge 0.5 in needles into the muscles listed below. 50 units of onabotulinumtoxinA were wasted.     Injection Sites: Total = 150 units onabotulinumtoxinA      and Procerus muscles - 5 units into the left and right corrugators and 5 units into the procerus (15 units total)    Frontalis muscles - 5 units into the left superior frontalis and 5 units into the right superior frontalis (2 injection sites per muscle) (10 units total)    Temporalis muscles - 12.5 units into the left temporalis muscle and 12.5 units into the right temporalis muscle (2 injection sites per muscle) (25 units total)    Occipitalis muscles - 12.5 units into the left occipitalis muscle and 12.5 units into the right occipitalis muscle (2 injection sites per muscle) (25 units total)    Splenius Capitis muscles - 12.5 units into the left splenius capitis muscle and 12.5 units into the right splenius capitis muscle (2 injection sites per muscle, divided into 2/3 anteriorly and 1/3 posteriorly) (25 units total)      Trapezius muscles - 25 units into the left trapezius muscle and 25 units into the right trapezius muscle (3 injection sites per muscle, divided 5 units, 10 units, 10 units, medial to lateral) (50 units total)    Ms. Aly tolerated the procedure well without immediate complications.  She will follow up in clinic for assessment of the effectiveness of treatment.  She did not report any change in her pain level after the botulinumtoxinA injection procedure.    America Palma MD  Pager: [phone]    Neurology Department  [facility] [place]  [address] [place] [address]  [id number]      Again, thank you for allowing me to participate in the care of your patient.      Sincerely,    America Palma MD

## 2020-09-14 NOTE — LETTER
9/14/2020       RE: Tessa Aly  [id number] [facility] [address]     Dear Colleague,    Thank you for referring your patient, Tessa Aly, to the [facility] NEUROLOGY at [facility]. Please see a copy of my visit note below.    [place], [place]  Botulinum Toxin Procedure    America Palma MD  Neurology    September 14, 2020    Procedure:  OnabotulinumtoxinA injections for chronic migraine  Indication:  Chronic migraine    HIT-6: 68    Ms. Aly suffers from severe intractable headaches.  She was referred by Dr. Palma for onabotulinumtoxinA injections for headache.  Risks, benefits, and alternatives were discussed.  All questions were answered and consent given.  She decided to proceed with the injections.     Prior to initiation of botulinum toxin injections, Ms. Aly reported 15-30 headache days per month, with 15-30 severe headache days per month. Her headaches are quite disabling and often interfere with her ability to function normally.    Ms. Aly reports 15 headache days per month currently, with 12 severe headache days per month.  She has noticed a wearing off phenomenon prior to this round of botulinum toxin injections, lasting a few weeks.    Menstrual period and stress are triggers.     For headache prevention, she currently takes topiramate 50 mg twice a day, which she has been on since August 2019.  In the past, she has tried nortriptyline which caused dry mouth and lost efficacy after a few months.  She also tried propranolol, which also lost efficacy after a few months.     She currently takes topiramate for prevention.     Ms. Aly's pain was assessed prior to the procedure.  She rated her pain today as 7 out of 10.    Procedural Pause: Procedural pause was conducted to verify correct patient identity, procedure to be performed, correct side and site, correct patient position, and special requirements. Appropriate hand  hygiene was utilized, and each injection site was prepped with alcohol wipes or Chloraprep swab.     Procedure Details: From lot number [id number], expiration date 5/2023, 200 units of onabotulinumtoxinA was diluted in 4 mL 0.9% normal saline, lot # [id number]. A total of 150 units of onabotulinumtoxinA were injected using 30 gauge 0.5 in needles into the muscles listed below. 50 units of onabotulinumtoxinA were wasted.     Injection Sites: Total = 150 units onabotulinumtoxinA      and Procerus muscles - 5 units into the left and right corrugators and 5 units into the procerus (15 units total)    Frontalis muscles - 5 units into the left superior frontalis and 5 units into the right superior frontalis (2 injection sites per muscle) (10 units total)    Temporalis muscles - 12.5 units into the left temporalis muscle and 12.5 units into the right temporalis muscle (2 injection sites per muscle) (25 units total)    Occipitalis muscles - 12.5 units into the left occipitalis muscle and 12.5 units into the right occipitalis muscle (2 injection sites per muscle) (25 units total)    Splenius Capitis muscles - 12.5 units into the left splenius capitis muscle and 12.5 units into the right splenius capitis muscle (2 injection sites per muscle, divided into 2/3 anteriorly and 1/3 posteriorly) (25 units total)      Trapezius muscles - 25 units into the left trapezius muscle and 25 units into the right trapezius muscle (3 injection sites per muscle, divided 5 units, 10 units, 10 units, medial to lateral) (50 units total)    Ms. Aly tolerated the procedure well without immediate complications.  She will follow up in clinic for assessment of the effectiveness of treatment.  She did not report any change in her pain level after the botulinumtoxinA injection procedure.    Again, thank you for allowing me to participate in the care of your patient.  Sincerely,    America Palma MD  Pager: [phone]

## 2021-01-03 ENCOUNTER — HEALTH MAINTENANCE LETTER (OUTPATIENT)
Age: 31
End: 2021-01-03

## 2021-01-04 ENCOUNTER — OFFICE VISIT (OUTPATIENT)
Dept: NEUROLOGY | Facility: CLINIC | Age: 31
End: 2021-01-04
Payer: COMMERCIAL

## 2021-01-04 DIAGNOSIS — G43.709 CHRONIC MIGRAINE WITHOUT AURA WITHOUT STATUS MIGRAINOSUS, NOT INTRACTABLE: Primary | ICD-10-CM

## 2021-01-04 PROCEDURE — 64615 CHEMODENERV MUSC MIGRAINE: CPT | Performed by: PSYCHIATRY & NEUROLOGY

## 2021-01-04 ASSESSMENT — HEADACHE IMPACT TEST (HIT 6)
HOW OFTEN HAVE YOU FELT TOO TIRED TO WORK BECAUSE OF YOUR HEADACHES: SOMETIMES
WHEN YOU HAVE A HEADACHE HOW OFTEN DO YOU WISH YOU COULD LIE DOWN: ALWAYS
WHEN YOU HAVE HEADACHES HOW OFTEN IS THE PAIN SEVERE: VERY OFTEN
HIT6 TOTAL SCORE: 65
HOW OFTEN DID HEADACHS LIMIT CONCENTRATION ON WORK OR DAILY ACTIVITY: SOMETIMES
HOW OFTEN DO HEADACHES LIMIT YOUR DAILY ACTIVITIES: SOMETIMES
HOW OFTEN HAVE YOU FELT FED UP OR IRRITATED BECAUSE OF YOUR HEADACHES: VERY OFTEN

## 2021-01-04 NOTE — PROGRESS NOTES
[place], [place]  Botulinum Toxin Procedure    America Palma MD  Neurology    January 4, 2021    Procedure:  OnabotulinumtoxinA injections for chronic migraine  Indication:  Chronic migraine    Ms. Aly suffers from severe intractable headaches.  She was referred by Dr. Palma for onabotulinumtoxinA injections for headache.  Risks, benefits, and alternatives were discussed.  All questions were answered and consent given.  She decided to proceed with the injections.     Prior to initiation of botulinum toxin injections, Ms. Aly reported 15-30 headache days per month, with 15-30 severe headache days per month. Her headaches are quite disabling and often interfere with her ability to function normally.    Ms. Aly reports 9-14 headache days per month currently, with 9-14 severe headache days per month.  She has noticed a wearing off phenomenon prior to this round of botulinum toxin injections, lasting a few weeks.    For headache prevention, she currently takes topiramate 50 mg twice a day, which she has been on since August 2019.  In the past, she has tried nortriptyline which caused dry mouth and lost efficacy after a few months.  She also tried propranolol, which also lost efficacy after a few months.     She currently takes topiramate for prevention.     Ms. Aly's pain was assessed prior to the procedure.  She rated her pain today as 3 out of 10.    Procedural Pause: Procedural pause was conducted to verify correct patient identity, procedure to be performed, correct side and site, correct patient position, and special requirements. Appropriate hand hygiene was utilized, and each injection site was prepped with alcohol wipes or Chloraprep swab.     Procedure Details: From lot number [id number], expiration date 9/2023, 200 units of onabotulinumtoxinA was diluted in 4 mL 0.9% normal saline. A total of 200 units of onabotulinumtoxinA were injected using 30 gauge 0.5 in needles into the  muscles listed below. 0 units of onabotulinumtoxinA were wasted.     Injection Sites: Total = 200 units onabotulinumtoxinA      and Procerus muscles - 5 units into the left and right corrugators and 5 units into the procerus (15 units total)    Frontalis muscles - 5 units into the left superior frontalis and 5 units into the right superior frontalis (2 injection sites per muscle) (10 units total)    Temporalis muscles - 25 units into the left temporalis muscle and 25 units into the right temporalis muscle (3 injection sites per muscle) (50 units total)    Occipitalis muscles - 25 units into the left occipitalis muscle and 25 units into the right occipitalis muscle (3 injection sites per muscle) (50 units total)    Splenius Capitis muscles - 12.5 units into the left splenius capitis muscle and 12.5 units into the right splenius capitis muscle (2 injection sites per muscle, divided into 2/3 anteriorly and 1/3 posteriorly) (25 units total)      Trapezius muscles - 25 units into the left trapezius muscle and 25 units into the right trapezius muscle (3 injection sites per muscle, divided 5 units, 10 units, 10 units, medial to lateral) (50 units total)    Ms. Aly tolerated the procedure well without immediate complications.  She will follow up in clinic for assessment of the effectiveness of treatment.  She did not report any change in her pain level after the botulinumtoxinA injection procedure.    America Palma MD  Pager: [phone]    Neurology Department  [facility] [address] [address]

## 2021-01-04 NOTE — LETTER
1/4/2021       RE: Tessa Aly  [address] [facility] [address]     Dear Colleague,    Thank you for referring your patient, Tessa Aly, to the [facility] NEUROLOGY CLINIC [place] at [facility]. Please see a copy of my visit note below.    [place], [place]  Botulinum Toxin Procedure    America Palma MD  Neurology    January 4, 2021    Procedure:  OnabotulinumtoxinA injections for chronic migraine  Indication:  Chronic migraine    Ms. Aly suffers from severe intractable headaches.  She was referred by Dr. Palma for onabotulinumtoxinA injections for headache.  Risks, benefits, and alternatives were discussed.  All questions were answered and consent given.  She decided to proceed with the injections.     Prior to initiation of botulinum toxin injections, Ms. Aly reported 15-30 headache days per month, with 15-30 severe headache days per month. Her headaches are quite disabling and often interfere with her ability to function normally.    Ms. Aly reports 9-14 headache days per month currently, with 9-14 severe headache days per month.  She has noticed a wearing off phenomenon prior to this round of botulinum toxin injections, lasting a few weeks.    For headache prevention, she currently takes topiramate 50 mg twice a day, which she has been on since August 2019.  In the past, she has tried nortriptyline which caused dry mouth and lost efficacy after a few months.  She also tried propranolol, which also lost efficacy after a few months.     She currently takes topiramate for prevention.     Ms. Aly's pain was assessed prior to the procedure.  She rated her pain today as 3 out of 10.    Procedural Pause: Procedural pause was conducted to verify correct patient identity, procedure to be performed, correct side and site, correct patient position, and special requirements. Appropriate hand hygiene was utilized, and each  injection site was prepped with alcohol wipes or Chloraprep swab.     Procedure Details: From lot number [id number], expiration date 9/2023, 200 units of onabotulinumtoxinA was diluted in 4 mL 0.9% normal saline. A total of 200 units of onabotulinumtoxinA were injected using 30 gauge 0.5 in needles into the muscles listed below. 0 units of onabotulinumtoxinA were wasted.     Injection Sites: Total = 200 units onabotulinumtoxinA      and Procerus muscles - 5 units into the left and right corrugators and 5 units into the procerus (15 units total)    Frontalis muscles - 5 units into the left superior frontalis and 5 units into the right superior frontalis (2 injection sites per muscle) (10 units total)    Temporalis muscles - 25 units into the left temporalis muscle and 25 units into the right temporalis muscle (3 injection sites per muscle) (50 units total)    Occipitalis muscles - 25 units into the left occipitalis muscle and 25 units into the right occipitalis muscle (3 injection sites per muscle) (50 units total)    Splenius Capitis muscles - 12.5 units into the left splenius capitis muscle and 12.5 units into the right splenius capitis muscle (2 injection sites per muscle, divided into 2/3 anteriorly and 1/3 posteriorly) (25 units total)      Trapezius muscles - 25 units into the left trapezius muscle and 25 units into the right trapezius muscle (3 injection sites per muscle, divided 5 units, 10 units, 10 units, medial to lateral) (50 units total)    Ms. Aly tolerated the procedure well without immediate complications.  She will follow up in clinic for assessment of the effectiveness of treatment.  She did not report any change in her pain level after the botulinumtoxinA injection procedure.    America Palma MD  Pager: [phone]    Neurology Department  [facility] [address] [address]

## 2021-03-24 ENCOUNTER — TELEPHONE (OUTPATIENT)
Dept: NEUROLOGY | Facility: CLINIC | Age: 31
End: 2021-03-24

## 2021-03-24 ENCOUNTER — VIRTUAL VISIT (OUTPATIENT)
Dept: NEUROLOGY | Facility: CLINIC | Age: 31
End: 2021-03-24
Payer: COMMERCIAL

## 2021-03-24 DIAGNOSIS — G43.709 CHRONIC MIGRAINE WITHOUT AURA WITHOUT STATUS MIGRAINOSUS, NOT INTRACTABLE: Primary | ICD-10-CM

## 2021-03-24 PROCEDURE — 99213 OFFICE O/P EST LOW 20 MIN: CPT | Mod: GT | Performed by: PSYCHIATRY & NEUROLOGY

## 2021-03-24 RX ORDER — RIZATRIPTAN BENZOATE 10 MG/1
10 TABLET ORAL
Qty: 18 TABLET | Refills: 11 | Status: SHIPPED | OUTPATIENT
Start: 2021-03-24 | End: 2021-04-05

## 2021-03-24 NOTE — TELEPHONE ENCOUNTER
Prior Authorization Retail Medication Request    Medication/Dose: galcanezumab-gnlm (EMGALITY) 120 MG/ML injection; Inject 2 mLs (240 mg) Subcutaneous once for 1 dose - Subcutaneous; Inject 1 mL (120 mg) Subcutaneous every 28 days - Subcutaneous  ICD code (if different than what is on RX):  G43.709    Previously Tried and Failed:  botox - While she did not have significant adverse effect with botulinum toxin injections, she does not feel that they are helpful or worth continuing.  tried propranolol, which also lost efficacy after a few months.   excedrin    Current: rizatriptan and topiramate  Rationale:  Chronic migraine    Insurance Name:  [employer]  Insurance ID:  [id number]       Pharmacy Information (if different than what is on RX)  Name:    Phone:

## 2021-03-24 NOTE — TELEPHONE ENCOUNTER
PA Initiation    Medication: galcanezumab-gnlm (EMGALITY) 120 MG/ML injection; Inject 2 mLs (240 mg) Subcutaneous once for 1 dose - Subcutaneous; Inject 1 mL (120 mg) Subcutaneous every 28 days - Subcutaneous  Insurance Company: [employer] - Phone [phone] Fax [phone]  Pharmacy Filling the Rx: [id number] [place], [place] - [address]  Filling Pharmacy Phone: [phone]  Filling Pharmacy Fax:    Start Date: 3/24/2021    Central Prior Authorization Team   Phone: [phone]        Demographics have been sent to [facility]  Will follow up to answer once received

## 2021-03-24 NOTE — PROGRESS NOTES
Tessa is a 30 year old who is being evaluated via a billable video visit.      How would you like to obtain your AVS? MyChart  If the video visit is dropped, the invitation should be resent by: Text to cell phone: [phone]      Video-Visit Details    Type of service:  Video Visit    Originating Location (pt. Location): Home    Distant Location (provider location):  [facility] NEUROLOGY CLINIC [place]     Platform used for Video Visit: RealPage     [facility]  Neurology Progress Note    Subjective:    Ms. Aly returns for follow-up of chronic migraine.  I last saw her for her most recent set of botulinum toxin injections.  She has been receiving this treatment for 1 year, and today's visit is to review her progress over that time.    Today, Ms. Aly reports two weeks of migraine monthly.  This is similar to her previous pattern, with 2 weeks of severe migraine attacks per month, which correlate with her menstrual cycles.  While she did not have significant adverse effect with botulinum toxin injections, she does not feel that they are helpful or worth continuing.    For acute treatment of migraine, she continues rizatriptan 10 mg as needed, which is helpful.    She also continues to take topiramate 50 mg twice a day, and is not sure if this is having any effect.    Objective:    Vitals: There were no vitals taken for this visit.  General: Cooperative, NAD  Neurologic:  Mental Status: Fully alert, attentive and oriented. Speech clear and fluent.   Cranial Nerves: Facial movements symmetric.   Motor: No abnormal movements.      Assessment/Plan:   Tessa Aly returns for follow-up of chronic migraine.  After a trial of botulinum toxin injections using a chronic migraine protocol, she has not noted significant difference, and would not choose to continue with treatment.    I recommend that she stop botulinum toxin injections.  We discussed other  options, including CGRP monoclonal antibodies.  -After discussion, we decided to also taper off of topiramate, as this has questionable benefit.  If her headaches significantly worsened in the setting of stopping topiramate, this could be restarted.  I recommend that she taper off by having her dose for 1 week before stopping.  -I recommend that she trial Emgality subcutaneous injection every 28 days.  She will start with a loading dose (2 pens), followed by 1 injection every 28 days.  I recommend a 3 to 6-month trial prior to determining effectiveness.  Potential side effects were discussed.  If she would prefer, a nurse visit could be arranged to assist with the first injections.    For acute treatment of headache, she will continue rizatriptan 10 mg as needed, with a repeat dose in 2 hours if needed.  This should not exceed more than 9 days/month to avoid medication overuse.    I spent 20 minutes on patient care, coordination of care, and documentation.  I will plan to see her back in 3 to 6 months to monitor her progress.  She will contact me in the meantime with any concerns.    America Palma MD  Neurology

## 2021-03-24 NOTE — LETTER
3/24/2021       RE: Tessa Aly  [address] [facility] [address]     Dear Colleague,    Thank you for referring your patient, Tessa Aly, to the [facility] NEUROLOGY CLINIC [place] at [facility]. Please see a copy of my visit note below.    Tessa is a 30 year old who is being evaluated via a billable video visit.      How would you like to obtain your AVS? MyChart  If the video visit is dropped, the invitation should be resent by: Text to cell phone: [phone]      Video-Visit Details    Type of service:  Video Visit    Originating Location (pt. Location): Home    Distant Location (provider location):  [facility] NEUROLOGY [facility]     Platform used for Video Visit: aiHit     [facility]    Clinics and Surgery Center  Neurology Progress Note    Subjective:    Ms. Aly returns for follow-up of chronic migraine.  I last saw her for her most recent set of botulinum toxin injections.  She has been receiving this treatment for 1 year, and today's visit is to review her progress over that time.    Today, Ms. Aly reports two weeks of migraine monthly.  This is similar to her previous pattern, with 2 weeks of severe migraine attacks per month, which correlate with her menstrual cycles.  While she did not have significant adverse effect with botulinum toxin injections, she does not feel that they are helpful or worth continuing.    For acute treatment of migraine, she continues rizatriptan 10 mg as needed, which is helpful.    She also continues to take topiramate 50 mg twice a day, and is not sure if this is having any effect.    Objective:    Vitals: There were no vitals taken for this visit.  General: Cooperative, NAD  Neurologic:  Mental Status: Fully alert, attentive and oriented. Speech clear and fluent.   Cranial Nerves: Facial movements symmetric.   Motor: No abnormal movements.       Assessment/Plan:   Tessa Aly returns for follow-up of chronic migraine.  After a trial of botulinum toxin injections using a chronic migraine protocol, she has not noted significant difference, and would not choose to continue with treatment.    I recommend that she stop botulinum toxin injections.  We discussed other options, including CGRP monoclonal antibodies.  -After discussion, we decided to also taper off of topiramate, as this has questionable benefit.  If her headaches significantly worsened in the setting of stopping topiramate, this could be restarted.  I recommend that she taper off by having her dose for 1 week before stopping.  -I recommend that she trial Emgality subcutaneous injection every 28 days.  She will start with a loading dose (2 pens), followed by 1 injection every 28 days.  I recommend a 3 to 6-month trial prior to determining effectiveness.  Potential side effects were discussed.  If she would prefer, a nurse visit could be arranged to assist with the first injections.    For acute treatment of headache, she will continue rizatriptan 10 mg as needed, with a repeat dose in 2 hours if needed.  This should not exceed more than 9 days/month to avoid medication overuse.    I spent 20 minutes on patient care, coordination of care, and documentation.  I will plan to see her back in 3 to 6 months to monitor her progress.  She will contact me in the meantime with any concerns.    America Palma MD  Neurology

## 2021-03-26 NOTE — TELEPHONE ENCOUNTER
Prior Authorization Approval    Authorization Effective Date: 2/23/2021  Authorization Expiration Date: 6/25/2021  Medication: galcanezumab-gnlm (EMGALITY) 120 MG/ML injection; Inject 2 mLs (240 mg) Subcutaneous once for 1 dose - Subcutaneous; Inject 1 mL (120 mg) Subcutaneous every 28 days - Subcutaneous  Approved Dose/Quantity:   Reference #: [id number]   Insurance Company: [employer] - Phone [phone] Fax [phone]  Which Pharmacy is filling the prescription (Not needed for infusion/clinic administered): [id number] [place] [address]  Pharmacy Notified: Yes **Instructed pharmacy to notify patient when script is ready to /ship.**  Patient Notified:  Yes     Received denial letter but in there mentions has been approved for maximum quantity of 4 pens in the first 3 months, called [facility] at [phone] and per rep this was approved with dates 02/23/21-06/25/21 and the pharmacy has a paid claim on 03/24 and her test claims pay as well.

## 2021-04-05 DIAGNOSIS — G43.709 CHRONIC MIGRAINE WITHOUT AURA WITHOUT STATUS MIGRAINOSUS, NOT INTRACTABLE: ICD-10-CM

## 2021-04-05 RX ORDER — RIZATRIPTAN BENZOATE 10 MG/1
10 TABLET ORAL
Qty: 18 TABLET | Refills: 11 | Status: SHIPPED | OUTPATIENT
Start: 2021-04-05 | End: 2021-06-16

## 2021-04-05 NOTE — TELEPHONE ENCOUNTER
Rx Authorization:    Requested Medication/ Dose rizatriptan (MAXALT) 10 MG tablet    Date last refill ordered: 3/24/21    Quantity ordered: 18 Tablets    # refills: 11    Date of last clinic visit with ordering provider: 3/24/21    Date of next clinic visit with ordering provider:     All pertinent protocol data (lab date/result):     Include pertinent information from patients message:

## 2021-04-21 DIAGNOSIS — G43.709 CHRONIC MIGRAINE WITHOUT AURA WITHOUT STATUS MIGRAINOSUS, NOT INTRACTABLE: ICD-10-CM

## 2021-04-25 ENCOUNTER — HEALTH MAINTENANCE LETTER (OUTPATIENT)
Age: 31
End: 2021-04-25

## 2021-06-02 ENCOUNTER — RECORDS - HEALTHEAST (OUTPATIENT)
Dept: ADMINISTRATIVE | Facility: CLINIC | Age: 31
End: 2021-06-02

## 2021-06-16 DIAGNOSIS — G43.709 CHRONIC MIGRAINE WITHOUT AURA WITHOUT STATUS MIGRAINOSUS, NOT INTRACTABLE: ICD-10-CM

## 2021-06-16 RX ORDER — RIZATRIPTAN BENZOATE 10 MG/1
10 TABLET ORAL
Qty: 18 TABLET | Refills: 11 | Status: SHIPPED | OUTPATIENT
Start: 2021-06-16 | End: 2021-11-10

## 2021-07-26 ENCOUNTER — VIRTUAL VISIT (OUTPATIENT)
Dept: NEUROLOGY | Facility: CLINIC | Age: 31
End: 2021-07-26
Payer: COMMERCIAL

## 2021-07-26 DIAGNOSIS — G43.709 CHRONIC MIGRAINE WITHOUT AURA WITHOUT STATUS MIGRAINOSUS, NOT INTRACTABLE: Primary | ICD-10-CM

## 2021-07-26 PROCEDURE — 99213 OFFICE O/P EST LOW 20 MIN: CPT | Mod: GT | Performed by: PSYCHIATRY & NEUROLOGY

## 2021-07-26 NOTE — PROGRESS NOTES
Tessa is a 30 year old who is being evaluated via a billable video visit.      How would you like to obtain your AVS? MyChart  If the video visit is dropped, the invitation should be resent by: Send to e-mail at: [email or web address]  Will anyone else be joining your video visit? No      Video Start Time: 11:04 am  Video-Visit Details    Type of service:  Video Visit    Video End Time:11:17 AM    Originating Location (pt. Location): Home    Distant Location (provider location):  [facility] NEUROLOGY CLINIC [place]     Platform used for Video Visit: Ordoro    [facility]  Neurology Progress Note    Subjective:    Ms. Aly returns for follow up of chronic migraine without aura.    She switched from topiramate to Emgality about 3 months ago. She denies side effects.    Since switching to Emgality, she reports that her severe attacks are rated 5-6/10 instead of 8/10 that they were previously. She continues to get 10+ headache days per month, similar duration. She continues to have 2 weeks of migraine per month, closely related to her menstrual cycle. She does not take any contraception, but is open to the idea if it could help with her headaches.    She has rizatriptan as needed, which remains helpful when taken at onset. She can be sleepy with this. However, if she were to take rizatriptan with each headache, this would be over 9x per month    Objective:    Vitals: There were no vitals taken for this visit.  General: Cooperative, NAD  Neurologic:  Mental Status: Fully alert, attentive and oriented. Speech clear and fluent.   Cranial Nerves: Facial movements symmetric.   Motor: No abnormal movements.      Assessment/Plan:   Tessa Aly returns for follow-up of chronic migraine without aura. In the past 3 months has initiated a trial of Emgality, which has decreased the average severity of her headaches, but has done little in terms of number of  "headache days per month or the duration of these headaches. Still, she thinks she is deriving some benefit and so would like to continue with re-assessment of efficacy in another 3 months.    - I recommended that she continue to use Emgality as a migraine preventative. Will continue for another 3 months and then re-assess how well it has been working for her  - She will also continue to take rizatriptan as an abortive headache medication. Encouraged the patient to take this medication at the beginning of her headaches rather than trying to \"hold out\". Reiterated that Rizatriptan can be taken for a max of 9 days per month, but that more than this increases the risk of rebound headache.  - Since she would run the risk of rebound headache should she use rizatriptan on all of her headaches, discussed starting out using Naproxen. If this does no control the headache within 1hr of symptom onset, would progress to a rizatriptan dose. Naproxen doses should not exceed 14 days per month due to risk of rebound headache.   - Also discussed potentially initiating some form of hormonal contraception to try and level out the patient's hormone levels throughout the month as the patient's menstrual cycle appears to be a significant trigger for her headaches. Encouraged the patient to discuss this option with her OB/Gyn or PCP.    Patient seen with Tasia Singleton MD, neurology resident PGY-2.    Physician Attestation   I, America Palma MD, saw this patient and agree with the findings and plan of care as documented in the note. I spent 21 minutes on patient care and documentation on the day of the visit.    America Palma MD  Neurology     "

## 2021-07-26 NOTE — LETTER
7/26/2021       RE: Tessa Aly  [address] [facility] [address]     Dear Colleague,    Thank you for referring your patient, Tessa Aly, to the [facility] NEUROLOGY CLINIC [place] at [facility]. Please see a copy of my visit note below.    [facility]  Neurology Progress Note    Subjective:    Ms. Aly returns for follow up of chronic migraine without aura.    She switched from topiramate to Emgality about 3 months ago. She denies side effects.    Since switching to Emgality, she reports that her severe attacks are rated 5-6/10 instead of 8/10 that they were previously. She continues to get 10+ headache days per month, similar duration. She continues to have 2 weeks of migraine per month, closely related to her menstrual cycle. She does not take any contraception, but is open to the idea if it could help with her headaches.    She has rizatriptan as needed, which remains helpful when taken at onset. She can be sleepy with this. However, if she were to take rizatriptan with each headache, this would be over 9x per month    Objective:    Vitals: There were no vitals taken for this visit.  General: Cooperative, NAD  Neurologic:  Mental Status: Fully alert, attentive and oriented. Speech clear and fluent.   Cranial Nerves: Facial movements symmetric.   Motor: No abnormal movements.      Assessment/Plan:   Tessa Aly returns for follow-up of chronic migraine without aura. In the past 3 months has initiated a trial of Emgality, which has decreased the average severity of her headaches, but has done little in terms of number of headache days per month or the duration of these headaches. Still, she thinks she is deriving some benefit and so would like to continue with re-assessment of efficacy in another 3 months.    - I recommended that she continue to use Emgality as a migraine preventative. Will  "continue for another 3 months and then re-assess how well it has been working for her  - She will also continue to take rizatriptan as an abortive headache medication. Encouraged the patient to take this medication at the beginning of her headaches rather than trying to \"hold out\". Reiterated that Rizatriptan can be taken for a max of 9 days per month, but that more than this increases the risk of rebound headache.  - Since she would run the risk of rebound headache should she use rizatriptan on all of her headaches, discussed starting out using Naproxen. If this does no control the headache within 1hr of symptom onset, would progress to a rizatriptan dose. Naproxen doses should not exceed 14 days per month due to risk of rebound headache.   - Also discussed potentially initiating some form of hormonal contraception to try and level out the patient's hormone levels throughout the month as the patient's menstrual cycle appears to be a significant trigger for her headaches. Encouraged the patient to discuss this option with her OB/Gyn or PCP.    Patient seen with Tasia Singleton MD, neurology resident PGY-2.    Physician Attestation   I, America Palma MD, saw this patient and agree with the findings and plan of care as documented in the note. I spent 21 minutes on patient care and documentation on the day of the visit.    America Palma MD  Neurology     Again, thank you for allowing me to participate in the care of your patient.      Sincerely,    America Palma MD  "

## 2021-10-10 ENCOUNTER — HEALTH MAINTENANCE LETTER (OUTPATIENT)
Age: 31
End: 2021-10-10

## 2021-11-09 ASSESSMENT — HEADACHE IMPACT TEST (HIT 6)
HIT6 TOTAL SCORE: 62
HOW OFTEN DID HEADACHS LIMIT CONCENTRATION ON WORK OR DAILY ACTIVITY: VERY OFTEN
HOW OFTEN HAVE YOU FELT TOO TIRED TO WORK BECAUSE OF YOUR HEADACHES: VERY OFTEN
WHEN YOU HAVE A HEADACHE HOW OFTEN DO YOU WISH YOU COULD LIE DOWN: SOMETIMES
WHEN YOU HAVE HEADACHES HOW OFTEN IS THE PAIN SEVERE: SOMETIMES
HOW OFTEN HAVE YOU FELT FED UP OR IRRITATED BECAUSE OF YOUR HEADACHES: SOMETIMES
HOW OFTEN DO HEADACHES LIMIT YOUR DAILY ACTIVITIES: SOMETIMES

## 2021-11-10 ENCOUNTER — VIRTUAL VISIT (OUTPATIENT)
Dept: NEUROLOGY | Facility: CLINIC | Age: 31
End: 2021-11-10
Payer: COMMERCIAL

## 2021-11-10 DIAGNOSIS — G43.709 CHRONIC MIGRAINE WITHOUT AURA WITHOUT STATUS MIGRAINOSUS, NOT INTRACTABLE: ICD-10-CM

## 2021-11-10 PROCEDURE — 99213 OFFICE O/P EST LOW 20 MIN: CPT | Mod: GT | Performed by: PSYCHIATRY & NEUROLOGY

## 2021-11-10 RX ORDER — RIZATRIPTAN BENZOATE 10 MG/1
10 TABLET ORAL
Qty: 18 TABLET | Refills: 11 | Status: SHIPPED | OUTPATIENT
Start: 2021-11-10

## 2021-11-10 NOTE — LETTER
11/10/2021       RE: Tessa Aly  [address] [place], [place] [address] [facility] [address]     Dear Colleague,    Thank you for referring your patient, Tessa Aly, to the [facility] NEUROLOGY CLINIC [place] at [facility]. Please see a copy of my visit note below.    MIGRAINE DISABILITY ASSESSMENT (MIDAS)    On how many days in the last 3 months did you miss work or school because of your headaches?  6    How many days in the last 3 months was your productivity at work or school reduced by half or more because of your headaches? (Do not include days you counted in question 1 where you missed work or school.)  0    On how many days in the last 3 months did you not do household work (such as housework, home repairs and maintenance, shopping, caring for children and relatives) because of your headaches?  9    How many days in the last 3 months was your productivity in household work reduced by half or more because of your headaches? (Do not include days you counted in question 3 where you did not do household work).  0    On how many days in the last 3 months did you miss family, social, or lesiure activities because of your headaches?  0    MIDAS Total Score:     On how many days in the last 3 months did you have a headache? (If a headache lasted more than 1 day, count each day.)   50    On a scale of 0 - 10, on average how painful were these headaches (where 0 = no pain at all, and 10 = pain as bad as it can be.)  4  Last Patient-Answered HIT-6 Questionnaire  HIT-6 11/9/2021   When you have headaches, how often is the pain severe 10   How often do headaches limit your ability to do usual daily activities including household work, work, school, or social activities? 10   When you have a headache, how often do you wish you could lie down? 10   In the past 4 weeks, how often have you felt too tired to do work or daily activities because of your headaches 11   In the past 4 weeks,  how often have you felt fed up or irritated because of your headaches 10   In the past 4 weeks, how often did headaches limit your ability to concentrate on work or daily activities 11   HIT-6 Total Score 62     [facility]  Neurology Progress Note    Subjective:    Ms. Aly returns for follow-up of chronic migraine.  I last saw her on March 24, 2021.  At that time, I recommended she start Emgality for headache prevention and stop botulinum toxin injections and topiramate.    Today, she reports that she hasn't had a severe migraine attack since starting Emgality. She denies side effects from Emgality, but the shot is painful.  She has her  injected for her.    She is off of topiramate now. She didn't notice any change in her headaches when stopping this.     She currently has headaches at a similar frequency, 15/30 days per month. These rate 4/10 instead of 8/10 now.     She takes Excedrin migraine as needed; she takes this about 9 days per month.  Excedrin Migraine works well for her generally.     She will also take rizatriptan, but finds that this doesn't work as well consistently, and can make her sleepy.     She has plans to pursue IVF, and wonders about headache treatment with this process forward.    Objective:    Vitals: There were no vitals taken for this visit.  General: Cooperative, NAD  Neurologic:  Mental Status: Fully alert, attentive and oriented. Speech clear and fluent.   Cranial Nerves: Facial movements symmetric.   Motor: No abnormal movements.      Assessment/Plan:   Tessa Aly is a 30-year-old woman who returns for follow-up of chronic migraine.  Her headache severity has significantly reduced with Emgality subcutaneous injection every 28 days.  She is using Excedrin Migraine and rizatriptan within safe limits.  Rizatriptan can make her sleepy and is sometimes ineffective.  She is planning pregnancy via IVF.    We discussed that  Emgality is not studied are known to be safe during pregnancy, so the general recommendation is to wait 5 half-lives before conception.  This would be approximately 5 or 6 months.  She states that the most recent injection, so she is already more than a month away from her most recent dose.  We reviewed that there is not strong data that Emgality is harmful during pregnancy, but that this recommendation is based off of lack of evidence and theoretical concerns for a growing embryo.    If she were to become pregnant in the future, her headache frequency and severity may change.  Some women find that they are migraine attacks become less frequent and severe, although this is not a guarantee.  We discussed the following strategy:  -For acute treatment of mild headache, and use acetaminophen as needed, not to exceed more than 14 days/month.  -For acute treatment of mild headache, she may also use caffeine as needed, not to exceed more than 200 mg daily.  -Triptans are generally thought to be safe in pregnancy.  I would be okay with her continuing rizatriptan as needed up to 9 days/month, if okay with her OB/GYN there are no concerns about growth restriction or development in the pregnancy.  This could be switched to eletriptan if rizatriptan is not effective.  She will let me know if a prescription is desired.  -Reglan could be used as an alternative to a triptan in pregnancy, or as a rescue medication.  She will let me know if a prescription is desired.    She will continue to hold Emgality while pursuing IVF.  -We discussed that supplements, such as magnesium and riboflavin could be considered as alternative preventatives in pregnancy.  It would also be reasonable to trial Cefaly antimigraine device, lidocaine only nerve blocks, or trials of amitriptyline, propranolol.  -She will let me know if additional preventative measures are needed. Otherwise, I will plan to see her back in 6 months.    America Palma,  MD  Neurology         Again, thank you for allowing me to participate in the care of your patient.      Sincerely,    America Palma MD

## 2021-11-10 NOTE — PROGRESS NOTES
Tessa is a 30 year old who is being evaluated via a billable video visit.      How would you like to obtain your AVS? MyChart  If the video visit is dropped, the invitation should be resent by: Text to cell phone: [phone]  Will anyone else be joining your video visit? No      Video Start Time: 9:11 AM  Video-Visit Details    Type of service:  Video Visit    Video End Time:9:28 AM    Originating Location (pt. Location): Home    Distant Location (provider location):  [facility] NEUROLOGY [facility]     Platform used for Video Visit: "[employer]Well     MIGRAINE DISABILITY ASSESSMENT (MIDAS)    On how many days in the last 3 months did you miss work or school because of your headaches?  6    How many days in the last 3 months was your productivity at work or school reduced by half or more because of your headaches? (Do not include days you counted in question 1 where you missed work or school.)  0    On how many days in the last 3 months did you not do household work (such as housework, home repairs and maintenance, shopping, caring for children and relatives) because of your headaches?  9    How many days in the last 3 months was your productivity in household work reduced by half or more because of your headaches? (Do not include days you counted in question 3 where you did not do household work).  0    On how many days in the last 3 months did you miss family, social, or lesiure activities because of your headaches?  0    MIDAS Total Score:     On how many days in the last 3 months did you have a headache? (If a headache lasted more than 1 day, count each day.)   50    On a scale of 0 - 10, on average how painful were these headaches (where 0 = no pain at all, and 10 = pain as bad as it can be.)  4  Last Patient-Answered HIT-6 Questionnaire  HIT-6 11/9/2021   When you have headaches, how often is the pain severe 10   How often do headaches limit your ability to do usual daily activities including  household work, work, school, or social activities? 10   When you have a headache, how often do you wish you could lie down? 10   In the past 4 weeks, how often have you felt too tired to do work or daily activities because of your headaches 11   In the past 4 weeks, how often have you felt fed up or irritated because of your headaches 10   In the past 4 weeks, how often did headaches limit your ability to concentrate on work or daily activities 11   HIT-6 Total Score 62     [facility]  Neurology Progress Note    Subjective:    Ms. Aly returns for follow-up of chronic migraine.  I last saw her on March 24, 2021.  At that time, I recommended she start Emgality for headache prevention and stop botulinum toxin injections and topiramate.    Today, she reports that she hasn't had a severe migraine attack since starting Emgality. She denies side effects from Emgality, but the shot is painful.  She has her  injected for her.    She is off of topiramate now. She didn't notice any change in her headaches when stopping this.     She currently has headaches at a similar frequency, 15/30 days per month. These rate 4/10 instead of 8/10 now.     She takes Excedrin migraine as needed; she takes this about 9 days per month.  Excedrin Migraine works well for her generally.     She will also take rizatriptan, but finds that this doesn't work as well consistently, and can make her sleepy.     She has plans to pursue IVF, and wonders about headache treatment with this process forward.    Objective:    Vitals: There were no vitals taken for this visit.  General: Cooperative, NAD  Neurologic:  Mental Status: Fully alert, attentive and oriented. Speech clear and fluent.   Cranial Nerves: Facial movements symmetric.   Motor: No abnormal movements.      Assessment/Plan:   Tessa Aly is a 30-year-old woman who returns for follow-up of chronic migraine.  Her headache  severity has significantly reduced with Emgality subcutaneous injection every 28 days.  She is using Excedrin Migraine and rizatriptan within safe limits.  Rizatriptan can make her sleepy and is sometimes ineffective.  She is planning pregnancy via IVF.    We discussed that Emgality is not studied are known to be safe during pregnancy, so the general recommendation is to wait 5 half-lives before conception.  This would be approximately 5 or 6 months.  She states that the most recent injection, so she is already more than a month away from her most recent dose.  We reviewed that there is not strong data that Emgality is harmful during pregnancy, but that this recommendation is based off of lack of evidence and theoretical concerns for a growing embryo.    If she were to become pregnant in the future, her headache frequency and severity may change.  Some women find that they are migraine attacks become less frequent and severe, although this is not a guarantee.  We discussed the following strategy:  -For acute treatment of mild headache, and use acetaminophen as needed, not to exceed more than 14 days/month.  -For acute treatment of mild headache, she may also use caffeine as needed, not to exceed more than 200 mg daily.  -Triptans are generally thought to be safe in pregnancy.  I would be okay with her continuing rizatriptan as needed up to 9 days/month, if okay with her OB/GYN there are no concerns about growth restriction or development in the pregnancy.  This could be switched to eletriptan if rizatriptan is not effective.  She will let me know if a prescription is desired.  -Reglan could be used as an alternative to a triptan in pregnancy, or as a rescue medication.  She will let me know if a prescription is desired.    She will continue to hold Emgality while pursuing IVF.  -We discussed that supplements, such as magnesium and riboflavin could be considered as alternative preventatives in pregnancy.  It would  also be reasonable to trial Cefaly antimigraine device, lidocaine only nerve blocks, or trials of amitriptyline, propranolol.  -She will let me know if additional preventative measures are needed. Otherwise, I will plan to see her back in 6 months.    America Palma MD  Neurology

## 2021-12-06 ENCOUNTER — IMMUNIZATION (OUTPATIENT)
Dept: NURSING | Facility: CLINIC | Age: 31
End: 2021-12-06
Payer: COMMERCIAL

## 2021-12-06 PROCEDURE — 91300 PR COVID VAC PFIZER DIL RECON 30 MCG/0.3 ML IM: CPT

## 2021-12-06 PROCEDURE — 0001A PR COVID VAC PFIZER DIL RECON 30 MCG/0.3 ML IM: CPT

## 2022-05-21 ENCOUNTER — HEALTH MAINTENANCE LETTER (OUTPATIENT)
Age: 32
End: 2022-05-21

## 2022-07-13 ENCOUNTER — LAB REQUISITION (OUTPATIENT)
Dept: LAB | Facility: CLINIC | Age: 32
End: 2022-07-13
Payer: COMMERCIAL

## 2022-07-13 ENCOUNTER — VIRTUAL VISIT (OUTPATIENT)
Dept: NEUROLOGY | Facility: CLINIC | Age: 32
End: 2022-07-13
Payer: COMMERCIAL

## 2022-07-13 DIAGNOSIS — Z01.812 ENCOUNTER FOR PREPROCEDURAL LABORATORY EXAMINATION: ICD-10-CM

## 2022-07-13 DIAGNOSIS — G43.709 CHRONIC MIGRAINE WITHOUT AURA WITHOUT STATUS MIGRAINOSUS, NOT INTRACTABLE: Primary | ICD-10-CM

## 2022-07-13 PROCEDURE — U0003 INFECTIOUS AGENT DETECTION BY NUCLEIC ACID (DNA OR RNA); SEVERE ACUTE RESPIRATORY SYNDROME CORONAVIRUS 2 (SARS-COV-2) (CORONAVIRUS DISEASE [COVID-19]), AMPLIFIED PROBE TECHNIQUE, MAKING USE OF HIGH THROUGHPUT TECHNOLOGIES AS DESCRIBED BY CMS-2020-01-R: HCPCS | Mod: ORL | Performed by: FAMILY MEDICINE

## 2022-07-13 PROCEDURE — 99214 OFFICE O/P EST MOD 30 MIN: CPT | Mod: GT | Performed by: PSYCHIATRY & NEUROLOGY

## 2022-07-13 ASSESSMENT — HEADACHE IMPACT TEST (HIT 6)
WHEN YOU HAVE A HEADACHE HOW OFTEN DO YOU WISH YOU COULD LIE DOWN: VERY OFTEN
HOW OFTEN HAVE YOU FELT FED UP OR IRRITATED BECAUSE OF YOUR HEADACHES: VERY OFTEN
HIT6 TOTAL SCORE: 62
HOW OFTEN DID HEADACHS LIMIT CONCENTRATION ON WORK OR DAILY ACTIVITY: SOMETIMES
HOW OFTEN HAVE YOU FELT TOO TIRED TO WORK BECAUSE OF YOUR HEADACHES: SOMETIMES
HOW OFTEN DO HEADACHES LIMIT YOUR DAILY ACTIVITIES: SOMETIMES
WHEN YOU HAVE HEADACHES HOW OFTEN IS THE PAIN SEVERE: SOMETIMES

## 2022-07-13 ASSESSMENT — MIGRAINE DISABILITY ASSESSMENT (MIDAS)
HOW OFTEN WERE SOCIAL ACTIVITIES MISSED DUE TO HEADACHES: 2
ON A SCALE FROM 0-10 ON AVERAGE HOW PAINFUL WERE HEADACHES: 6
HOW MANY DAYS DID YOU MISS WORK OR SCHOOL BECAUSE OF HEADACHES: 6
HOW MANY DAYS WAS HOUSEWORK PRODUCTIVITY CUT IN HALF DUE TO HEADACHES: 0
HOW MANY DAYS IN THE PAST 3 MONTHS HAVE YOU HAD A HEADACHE: 45
HOW MANY DAYS WAS YOUR PRODUCTIVITY CUT IN HALF BECAUSE OF HEADACHES: 6
TOTAL SCORE: 17
HOW MANY DAYS DID YOU NOT DO HOUSEWORK BECAUSE OF HEADACHES: 3

## 2022-07-13 NOTE — LETTER
7/13/2022       RE: Tessa Aly  [address] [facility] [address]     Dear Colleague,    Thank you for referring your patient, Tessa Aly, to the [facility] NEUROLOGY CLINIC [place] at [facility]. Please see a copy of my visit note below.    [facility]  Neurology Progress Note    Subjective:      Ms. Aly returns for follow-up of chronic migraine.  At her last visit, we discussed stopping Emgality in preparation for possible pregnancy.  She has been off the medication 5 or 6 months now.    She came off of her preventative treatments in preparation for IVF. She is now having more frequent and severe headaches, 15/30 headache days a month.    She is using Excedrin migraine or rizatriptan as needed. These are helpful.    On Friday, she is starting treatment for IVF.    Objective:    Vitals: There were no vitals taken for this visit.  General: Cooperative, NAD  Neurologic:  Mental Status: Fully alert, attentive and oriented. Speech clear and fluent.   Cranial Nerves: Facial movements symmetric.   Motor: No abnormal movements.      Assessment/Plan:     Tessa Aly is a 31-year-old woman who returns for follow-up of chronic migraine.  Her headache severity had significantly reduced with Emgality subcutaneous injection every 28 days, but this has been held the last 6 months in preparation for IVF, with subsequent increase in headache.  She is using Excedrin Migraine and rizatriptan within safe limits.       As she starts IVF, we discussed a treatment strategy:  -For acute treatment of mild headache, and use acetaminophen as needed, not to exceed more than 14 days/month.  -For acute treatment of mild headache, she may also use caffeine as needed, not to exceed more than 200 mg daily.  -Triptans are generally thought to be safe in pregnancy.  I would be okay with her continuing rizatriptan as needed  up to 9 days/month, if okay with her OB/GYN there are no concerns about growth restriction or development in the pregnancy.    -Reglan could be used as an alternative to a triptan in pregnancy, or as a rescue medication.  She did not think this was warranted today.  She will let me know if a prescription is desired in the future.     She will continue to hold Emgality while pursuing IVF.  -We discussed that supplements, such as magnesium and riboflavin could be considered as alternative preventatives in pregnancy.  It would also be reasonable to trial Cefaly antimigraine device, lidocaine only nerve blocks.   -She will let me know if additional preventative measures are needed, such as amitriptyline or propranolol. Otherwise, I will plan to see her back in 6 months.      America Palma MD  Neurology

## 2022-07-13 NOTE — PROGRESS NOTES
Tessa is a 31 year old who is being evaluated via a billable video visit.      How would you like to obtain your AVS? MyChart  If the video visit is dropped, the invitation should be resent by: Send to e-mail at: [email or web address]  Will anyone else be joining your video visit? No        Video-Visit Details    Video Start Time: 8:42 AM    Type of service:  Video Visit    Video End Time:8:50 AM    Originating Location (pt. Location): Home    Distant Location (provider location):  [facility] NEUROLOGY CLINIC [place]     Platform used for Video Visit: [employer]     [facility]  Neurology Progress Note    Subjective:      Ms. Aly returns for follow-up of chronic migraine.  At her last visit, we discussed stopping Emgality in preparation for possible pregnancy.  She has been off the medication 5 or 6 months now.    She came off of her preventative treatments in preparation for IVF. She is now having more frequent and severe headaches, 15/30 headache days a month.    She is using Excedrin migraine or rizatriptan as needed. These are helpful.    On Friday, she is starting treatment for IVF.    Objective:    Vitals: There were no vitals taken for this visit.  General: Cooperative, NAD  Neurologic:  Mental Status: Fully alert, attentive and oriented. Speech clear and fluent.   Cranial Nerves: Facial movements symmetric.   Motor: No abnormal movements.      Assessment/Plan:     Tessa Aly is a 31-year-old woman who returns for follow-up of chronic migraine.  Her headache severity had significantly reduced with Emgality subcutaneous injection every 28 days, but this has been held the last 6 months in preparation for IVF, with subsequent increase in headache.  She is using Excedrin Migraine and rizatriptan within safe limits.       As she starts IVF, we discussed a treatment strategy:  -For acute treatment of mild headache, and use acetaminophen as  needed, not to exceed more than 14 days/month.  -For acute treatment of mild headache, she may also use caffeine as needed, not to exceed more than 200 mg daily.  -Triptans are generally thought to be safe in pregnancy.  I would be okay with her continuing rizatriptan as needed up to 9 days/month, if okay with her OB/GYN there are no concerns about growth restriction or development in the pregnancy.    -Reglan could be used as an alternative to a triptan in pregnancy, or as a rescue medication.  She did not think this was warranted today.  She will let me know if a prescription is desired in the future.     She will continue to hold Emgality while pursuing IVF.  -We discussed that supplements, such as magnesium and riboflavin could be considered as alternative preventatives in pregnancy.  It would also be reasonable to trial Cefaly antimigraine device, lidocaine only nerve blocks.   -She will let me know if additional preventative measures are needed, such as amitriptyline or propranolol. Otherwise, I will plan to see her back in 6 months.    America Palma MD  Neurology

## 2022-07-14 ENCOUNTER — TELEPHONE (OUTPATIENT)
Dept: NEUROLOGY | Facility: CLINIC | Age: 32
End: 2022-07-14

## 2022-07-14 LAB — SARS-COV-2 RNA RESP QL NAA+PROBE: NEGATIVE

## 2022-07-14 NOTE — TELEPHONE ENCOUNTER
NORMAM for pt to call and schedule a follow up appointment with Dr. Palma in 6 months for a video visit

## 2022-09-14 ENCOUNTER — LAB REQUISITION (OUTPATIENT)
Dept: LAB | Facility: CLINIC | Age: 32
End: 2022-09-14
Payer: COMMERCIAL

## 2022-09-14 DIAGNOSIS — Z01.812 ENCOUNTER FOR PREPROCEDURAL LABORATORY EXAMINATION: ICD-10-CM

## 2022-09-14 PROCEDURE — U0003 INFECTIOUS AGENT DETECTION BY NUCLEIC ACID (DNA OR RNA); SEVERE ACUTE RESPIRATORY SYNDROME CORONAVIRUS 2 (SARS-COV-2) (CORONAVIRUS DISEASE [COVID-19]), AMPLIFIED PROBE TECHNIQUE, MAKING USE OF HIGH THROUGHPUT TECHNOLOGIES AS DESCRIBED BY CMS-2020-01-R: HCPCS | Mod: ORL | Performed by: FAMILY MEDICINE

## 2022-09-15 LAB — SARS-COV-2 RNA RESP QL NAA+PROBE: NEGATIVE

## 2022-09-18 ENCOUNTER — HEALTH MAINTENANCE LETTER (OUTPATIENT)
Age: 32
End: 2022-09-18

## 2022-11-21 ENCOUNTER — OFFICE VISIT (OUTPATIENT)
Dept: NEUROLOGY | Facility: CLINIC | Age: 32
End: 2022-11-21
Payer: COMMERCIAL

## 2022-11-21 ENCOUNTER — LAB REQUISITION (OUTPATIENT)
Dept: LAB | Facility: CLINIC | Age: 32
End: 2022-11-21

## 2022-11-21 VITALS
DIASTOLIC BLOOD PRESSURE: 80 MMHG | OXYGEN SATURATION: 99 % | RESPIRATION RATE: 16 BRPM | SYSTOLIC BLOOD PRESSURE: 118 MMHG | HEART RATE: 76 BPM

## 2022-11-21 DIAGNOSIS — R41.3 OTHER AMNESIA: ICD-10-CM

## 2022-11-21 DIAGNOSIS — G43.709 CHRONIC MIGRAINE WITHOUT AURA WITHOUT STATUS MIGRAINOSUS, NOT INTRACTABLE: Primary | ICD-10-CM

## 2022-11-21 LAB
TSH SERPL DL<=0.005 MIU/L-ACNC: 0.75 UIU/ML (ref 0.3–4.2)
VIT B12 SERPL-MCNC: 440 PG/ML (ref 232–1245)

## 2022-11-21 PROCEDURE — 84443 ASSAY THYROID STIM HORMONE: CPT | Performed by: FAMILY MEDICINE

## 2022-11-21 PROCEDURE — 82607 VITAMIN B-12: CPT | Performed by: FAMILY MEDICINE

## 2022-11-21 PROCEDURE — 99214 OFFICE O/P EST MOD 30 MIN: CPT | Mod: GC | Performed by: PSYCHIATRY & NEUROLOGY

## 2022-11-21 RX ORDER — NARATRIPTAN 2.5 MG/1
2.5 TABLET ORAL
Qty: 18 TABLET | Refills: 3 | Status: SHIPPED | OUTPATIENT
Start: 2022-11-21 | End: 2023-02-08

## 2022-11-21 ASSESSMENT — HEADACHE IMPACT TEST (HIT 6)
HOW OFTEN DO HEADACHES LIMIT YOUR DAILY ACTIVITIES: SOMETIMES
HIT6 TOTAL SCORE: 65
WHEN YOU HAVE HEADACHES HOW OFTEN IS THE PAIN SEVERE: SOMETIMES
HOW OFTEN DID HEADACHS LIMIT CONCENTRATION ON WORK OR DAILY ACTIVITY: VERY OFTEN
HOW OFTEN HAVE YOU FELT TOO TIRED TO WORK BECAUSE OF YOUR HEADACHES: SOMETIMES
WHEN YOU HAVE A HEADACHE HOW OFTEN DO YOU WISH YOU COULD LIE DOWN: ALWAYS
HOW OFTEN HAVE YOU FELT FED UP OR IRRITATED BECAUSE OF YOUR HEADACHES: VERY OFTEN

## 2022-11-21 ASSESSMENT — PAIN SCALES - GENERAL: PAINLEVEL: NO PAIN (0)

## 2022-11-21 NOTE — PROGRESS NOTES
[facility]  Headache Neurology  Progress Note      Tessa Aly  [id number]  11/21/2022    Interval events and subjective:  Tessa is accompanied by her  today.  Recently she had neurocognitive testing done which was ordered by her counselor for evaluation of ADHD.  She was told that she has memory problem and she needs to be seen by neurologist for evaluation of possible neurologic disease and potentially having an EEG done.    She denies any periods of confusion, loss of consciousness and staring off.  States that she experiences déjà vu but not more than what other people do.    Denies personal or family history of seizure.  Denies losing track of time.  Denies weakness, numbness,  and changes in her vision.  Denies difficulty with speech, balance and walking. Sometimes she does not remember the words she wants to say but she eventually would get it.     Over the past few years she has been having difficulty with concentration.  She needs to put her doctor's appointment on her calendar otherwise she would forget them.  Denies difficulty with driving and navigation.  Has never got lost.  Denies misplacing stuff.  Sometimes has difficulty remembering names and numbers but that is not a new thing. Her  denies noticing anything unusual and concerning. Her counselor is evaluation her for ADHD.    She is a  at [employer].  Recently she has been having difficulty mainly because of lack of focus. She had a head trauma 2 years ago and her ability to focus has been poor since then. She has not been told by coworker that she would doze off.    As far as headache, recently she has been off migraine medications as she was trying to get pregnant.  She was doing fine for a while however recently she had increased in the frequency of headaches.  Last week she had 5 days of constant headache.    Objective:  Physical Examination   Vitals: /80   Pulse 76   Resp  16   SpO2 99%   Mental status: Awake, alert, attentive,Language is fluent and coherent with intact comprehension of complex commands  Cranial nerves: PERRL, conjugate gaze, EOMI, facial sensation intact, face symmetric, shoulder shrug strong, tongue/uvula midline, no dysarthria.   Motor: Normal bulk. No abnormal movements. 5/5 strength in 4/4 extremities.   Reflexes:: Normal reflexic and symmetric biceps, brachioradialis, triceps, patellae, and achilles.   Sensory: Intact to light touch x4  Coordination: FNF without ataxia or dysmetria.  Gait: Normal gait.  Able to walk on toes and heels      Pertinent Studies    I have personally reviewed most recent and pertinent labs, tests, and radiological images.     Assessment & Plan:    Tessa Aly is a 32-year-old woman who returns for follow-up of chronic migraine and evaluation for neurocognitive problems.  Recently had a neurocognitive evaluation done which revealed mild neurocognitive disorder and was referred to neurologist for EEG.  There is no focal neurologic deficit on her exam.  She denies any symptoms or history concerning for seizure therefore I would not recommend EEG evaluation at this time.  There is low suspicion for vascular injury given the lack of risk factors and her age however given the history of of head trauma would recommend brain MRI to evaluate for possible brain injury/concussion.      She is wondering if she can try abortive medication that does not make her sleepy.  We discussed that longer acting triptans are the better choice for her compared to short actings that she tried before.  She is interested in trying Naratriptan.    - Brain MRI with and without contrast  - Trial naratriptan    Patient was seen and discussed with attending Dr. Palma.    Alen Barnard MD  Neurology resident    Physician Attestation   I, America Palma MD, saw this patient and agree with the findings and plan of care as documented in the note.      America BUENROSTRO  MD [place]

## 2022-11-21 NOTE — LETTER
11/21/2022       RE: Tessa Aly  [address] [address]     Dear Colleague,    Thank you for referring your patient, Tessa Aly, to the [facility]UROLOGY CL[facility]IC [facility]. Please see a copy of my visit note below.    .      [facility]  Headache Neurology  Progress Note      Tessa Aly  [id number]  11/21/2022    Interval events and subjective:  Tessa is accompanied by her  today.  Recently she had neurocognitive testing done which was ordered by her counselor for evaluation of ADHD.  She was told that she has memory problem and she needs to be seen by neurologist for evaluation of possible neurologic disease and potentially having an EEG done.    She denies any periods of confusion, loss of consciousness and staring off.  States that she experiences déjà vu but not more than what other people do.    Denies personal or family history of seizure.  Denies losing track of time.  Denies weakness, numbness,  and changes in her vision.  Denies difficulty with speech, balance and walking. Sometimes she does not remember the words she wants to say but she eventually would get it.     Over the past few years she has been having difficulty with concentration.  She needs to put her doctor's appointment on her calendar otherwise she would forget them.  Denies difficulty with driving and navigation.  Has never got lost.  Denies misplacing stuff.  Sometimes has difficulty remembering names and numbers but that is not a new thing. Her  denies noticing anything unusual and concerning. Her counselor is evaluation her for ADHD.    She is a  at [employer].  Recently she has been having difficulty mainly because of lack of focus. She had a head trauma 2 years ago and her ability to focus has been poor since then. She has not been told by coworker that she would doze off.    As far as  headache, recently she has been off migraine medications as she was trying to get pregnant.  She was doing fine for a while however recently she had increased in the frequency of headaches.  Last week she had 5 days of constant headache.    Objective:  Physical Examination   Vitals: /80   Pulse 76   Resp 16   SpO2 99%   Mental status: Awake, alert, attentive,Language is fluent and coherent with intact comprehension of complex commands  Cranial nerves: PERRL, conjugate gaze, EOMI, facial sensation intact, face symmetric, shoulder shrug strong, tongue/uvula midline, no dysarthria.   Motor: Normal bulk. No abnormal movements. 5/5 strength in 4/4 extremities.   Reflexes:: Normal reflexic and symmetric biceps, brachioradialis, triceps, patellae, and achilles.   Sensory: Intact to light touch x4  Coordination: FNF without ataxia or dysmetria.  Gait: Normal gait.  Able to walk on toes and heels      Pertinent Studies    I have personally reviewed most recent and pertinent labs, tests, and radiological images.     Assessment & Plan:    Tessa Aly is a 32-year-old woman who returns for follow-up of chronic migraine and evaluation for neurocognitive problems.  Recently had a neurocognitive evaluation done which revealed mild neurocognitive disorder and was referred to neurologist for EEG.  There is no focal neurologic deficit on her exam.  She denies any symptoms or history concerning for seizure therefore I would not recommend EEG evaluation at this time.  There is low suspicion for vascular injury given the lack of risk factors and her age however given the history of of head trauma would recommend brain MRI to evaluate for possible brain injury/concussion.      She is wondering if she can try abortive medication that does not make her sleepy.  We discussed that longer acting triptans are the better choice for her compared to short actings that she tried before.  She is interested in trying Naratriptan.    -  Brain MRI with and without contrast  - Trial naratriptan    Patient was seen and discussed with attending Dr. Palma.    Alen Barnard MD  Neurology resident    Physician Attestation   I, America Palma MD, saw this patient and agree with the findings and plan of care as documented in the note.              Again, thank you for allowing me to participate in the care of your patient.      Sincerely,    America Palma MD

## 2022-11-30 ENCOUNTER — HOSPITAL ENCOUNTER (OUTPATIENT)
Dept: MRI IMAGING | Facility: CLINIC | Age: 32
Discharge: HOME | End: 2022-11-30
Attending: PSYCHIATRY & NEUROLOGY | Admitting: PSYCHIATRY & NEUROLOGY
Payer: COMMERCIAL

## 2022-11-30 DIAGNOSIS — G43.709 CHRONIC MIGRAINE WITHOUT AURA WITHOUT STATUS MIGRAINOSUS, NOT INTRACTABLE: ICD-10-CM

## 2022-11-30 PROCEDURE — 255N000002 HC RX 255 OP 636: Performed by: PSYCHIATRY & NEUROLOGY

## 2022-11-30 PROCEDURE — 70553 MRI BRAIN STEM W/O & W/DYE: CPT

## 2022-11-30 PROCEDURE — A9585 GADOBUTROL INJECTION: HCPCS | Performed by: PSYCHIATRY & NEUROLOGY

## 2022-11-30 PROCEDURE — 70551 MRI BRAIN STEM W/O DYE: CPT

## 2022-11-30 RX ORDER — GADOBUTROL 604.72 MG/ML
6 INJECTION INTRAVENOUS ONCE
Status: COMPLETED | OUTPATIENT
Start: 2022-11-30 | End: 2022-11-30

## 2022-11-30 RX ADMIN — GADOBUTROL 6 ML: 604.72 INJECTION INTRAVENOUS at 16:02

## 2023-02-08 ENCOUNTER — TELEPHONE (OUTPATIENT)
Dept: NEUROLOGY | Facility: CLINIC | Age: 33
End: 2023-02-08

## 2023-02-08 ENCOUNTER — VIRTUAL VISIT (OUTPATIENT)
Dept: NEUROLOGY | Facility: CLINIC | Age: 33
End: 2023-02-08
Payer: COMMERCIAL

## 2023-02-08 DIAGNOSIS — G43.709 CHRONIC MIGRAINE WITHOUT AURA WITHOUT STATUS MIGRAINOSUS, NOT INTRACTABLE: ICD-10-CM

## 2023-02-08 PROCEDURE — 99214 OFFICE O/P EST MOD 30 MIN: CPT | Mod: GT | Performed by: PSYCHIATRY & NEUROLOGY

## 2023-02-08 RX ORDER — ELETRIPTAN HYDROBROMIDE 20 MG/1
20 TABLET, FILM COATED ORAL
Qty: 18 TABLET | Refills: 11 | Status: SHIPPED | OUTPATIENT
Start: 2023-02-08 | End: 2024-05-07

## 2023-02-08 ASSESSMENT — HEADACHE IMPACT TEST (HIT 6)
WHEN YOU HAVE HEADACHES HOW OFTEN IS THE PAIN SEVERE: SOMETIMES
HIT6 TOTAL SCORE: 62
HOW OFTEN DID HEADACHS LIMIT CONCENTRATION ON WORK OR DAILY ACTIVITY: SOMETIMES
WHEN YOU HAVE A HEADACHE HOW OFTEN DO YOU WISH YOU COULD LIE DOWN: VERY OFTEN
HOW OFTEN HAVE YOU FELT FED UP OR IRRITATED BECAUSE OF YOUR HEADACHES: VERY OFTEN
HOW OFTEN HAVE YOU FELT TOO TIRED TO WORK BECAUSE OF YOUR HEADACHES: SOMETIMES
HOW OFTEN DO HEADACHES LIMIT YOUR DAILY ACTIVITIES: SOMETIMES

## 2023-02-08 ASSESSMENT — MIGRAINE DISABILITY ASSESSMENT (MIDAS)
ON A SCALE FROM 0-10 ON AVERAGE HOW PAINFUL WERE HEADACHES: 6
HOW MANY DAYS DID YOU MISS WORK OR SCHOOL BECAUSE OF HEADACHES: 0
HOW MANY DAYS WAS YOUR PRODUCTIVITY CUT IN HALF BECAUSE OF HEADACHES: 12
TOTAL SCORE: 16
HOW MANY DAYS DID YOU NOT DO HOUSEWORK BECAUSE OF HEADACHES: 0
HOW MANY DAYS WAS HOUSEWORK PRODUCTIVITY CUT IN HALF DUE TO HEADACHES: 4
HOW OFTEN WERE SOCIAL ACTIVITIES MISSED DUE TO HEADACHES: 0
HOW MANY DAYS IN THE PAST 3 MONTHS HAVE YOU HAD A HEADACHE: 40

## 2023-02-08 NOTE — PROGRESS NOTES
Video-Visit Details    Type of service:  Video Visit    Video Start Time (time video started): 10:42am    Video End Time (time video stopped): 10:53am    Originating Location (pt. Location): Home        Distant Location (provider location):  Off-site    Mode of Communication:  Video Conference via [facility]    Headache Neurology Progress Note  February 8, 2023    Subjective:    Tessa Aly returns for follow up of chronic migraine.  At our last visit, I recommended an MRI of the brain and a trial of naratriptan.    MRI of the brain showed white matter changes in the frontal lobes, without concern for acute pathology.    She tried naratriptan, which took too long to work and some side effects of grogginess/dizziness.  The side effects were similar to her experience with rizatriptan.    She currently has 15+/30 headache days, with 2-3/30 severe headache days per month. They rate 5-8/10.    She is interested in restarting Emgality, which was previously effective for her.  She does not have any plans to become pregnant the next 1 year.    Objective:    Vitals: There were no vitals taken for this visit.  General: Cooperative, NAD  Neurologic:  Mental Status: Fully alert, attentive and oriented. Speech clear and fluent.   Cranial Nerves: Facial movements symmetric.   Motor: No abnormal movements.      Pertinent Investigations:      MRI brain (11/30/2023):  1.  No finding for intracranial hemorrhage, mass, or acute infarct.     2.  There are a few very small foci of FLAIR hyperintensity within the frontal white matter. These are nonspecific but compatible with tiny foci of gliosis and/or chronic myelin loss of doubtful clinical significance.    Assessment/Plan:   Tessa Aly is a 32-year-old woman who returns for follow-up of chronic migraine and evaluation for neurocognitive problems.     Neurocognitive evaluation revealed mild neurocognitive disorder. There is no focal  neurologic deficit on her exam.  MRI of the brain did not reveal any acute pathology.  There are some white matter changes, gliosis that could be a sequela of previous head trauma, although this cannot be proven.   I do not recommend further work-up for this currently.     For migraine management, I recommend resuming Emgality, starting with a loading dose.  I will reorder Emgality 240 mg loading dose and 120 g dose every 28 days.    For acute treatment, I recommend a trial of eletriptan 20 mg at the onset of headache, with a repeat dose in 2 hours if needed but should not exceed more than 9 days/month to avoid medication overuse.  -Alternative triptans, or CGRP inhibitor could be considered in the future.     I will see her back in 3 to 6 months to monitor her progress.    America Palma MD  Neurology

## 2023-02-08 NOTE — NURSING NOTE
Chief Complaint   Patient presents with     Video Visit     6 Month follow up      Is the patient currently in the state of [place]? YES    Visit mode:VIDEO    If the visit is dropped, the patient can be reconnected by: VIDEO VISIT: Text to cell phone: [phone]    Will anyone else be joining the visit? YES: Angel      How would you like to obtain your AVS? MyChart    Are changes needed to the allergy or medication list? NO    Comments or concerns related to today's visit:

## 2023-02-08 NOTE — TELEPHONE ENCOUNTER
Prior Authorization Retail Medication Request    Medication/Dose: galcanezumab-gnlm (EMGALITY) 120 MG/ML injection 2 mL     Please note 240mg is the loading dose, then 120mg q 28 days.   ICD code (if different than what is on RX):  Previously Tried and Failed: excedrine, She tried naratriptan, which took too long to work and some side effects of grogginess/dizziness.  The side effects were similar to her experience with rizatriptan.   Rationale: migraine  She currently has 15+/30 headache days, with 2-3/30 severe headache days per month. They rate 5-8/10.    Insurance Name: [employer]   Insurance ID:  [id number]       Pharmacy Information (if different than what is on RX)  Name:    Phone:

## 2023-02-08 NOTE — LETTER
2/8/2023       RE: Tessa Aly  [address] [place] [id number]     Dear Colleague,    Thank you for referring your patient, Tessa Aly, to the [facility]OLOG[facility]. Please see a copy of my visit note below.    [facility]    Headache Neurology Progress Note  February 8, 2023    Subjective:    Tessa Aly returns for follow up of chronic migraine.  At our last visit, I recommended an MRI of the brain and a trial of naratriptan.    MRI of the brain showed white matter changes in the frontal lobes, without concern for acute pathology.    She tried naratriptan, which took too long to work and some side effects of grogginess/dizziness.  The side effects were similar to her experience with rizatriptan.    She currently has 15+/30 headache days, with 2-3/30 severe headache days per month. They rate 5-8/10.    She is interested in restarting Emgality, which was previously effective for her.  She does not have any plans to become pregnant the next 1 year.    Objective:    Vitals: There were no vitals taken for this visit.  General: Cooperative, NAD  Neurologic:  Mental Status: Fully alert, attentive and oriented. Speech clear and fluent.   Cranial Nerves: Facial movements symmetric.   Motor: No abnormal movements.      Pertinent Investigations:      MRI brain (11/30/2023):  1.  No finding for intracranial hemorrhage, mass, or acute infarct.     2.  There are a few very small foci of FLAIR hyperintensity within the frontal white matter. These are nonspecific but compatible with tiny foci of gliosis and/or chronic myelin loss of doubtful clinical significance.    Assessment/Plan:   Tessa Aly is a 32-year-old woman who returns for follow-up of chronic migraine and evaluation for neurocognitive problems.     Neurocognitive evaluation revealed mild neurocognitive disorder. There is no focal neurologic  deficit on her exam.  MRI of the brain did not reveal any acute pathology.  There are some white matter changes, gliosis that could be a sequela of previous head trauma, although this cannot be proven.   I do not recommend further work-up for this currently.     For migraine management, I recommend resuming Emgality, starting with a loading dose.  I will reorder Emgality 240 mg loading dose and 120 g dose every 28 days.    For acute treatment, I recommend a trial of eletriptan 20 mg at the onset of headache, with a repeat dose in 2 hours if needed but should not exceed more than 9 days/month to avoid medication overuse.  -Alternative triptans, or CGRP inhibitor could be considered in the future.     I will see her back in 3 to 6 months to monitor her progress.    America Palma MD  Neurology

## 2023-02-11 NOTE — TELEPHONE ENCOUNTER
Central Prior Authorization Team   Phone: [phone]      PA Initiation    Medication: galcanezumab-gnlm (EMGALITY) 120 MG/ML injection 2 mL -PA initiated  Insurance Company: [employer] - Phone [phone] Fax [phone]  Pharmacy [facility]illing the [id number] [place], [place] - [address]  Filling Pharmacy Phone: [phone]  Filling Pharmacy Fax:    Start Date: 2/11/2023

## 2023-02-14 NOTE — TELEPHONE ENCOUNTER
Prior Authorization Not Needed per Insurance    Medication: galcanezumab-gnlm (EMGALITY) 120 MG/ML injection 2 mL -PA not needed  Insurance Company: [employer] - Phone [phone] Fax [phone]  Expected CoPay:      Pharm[facility] [id number] [place], [place] [facility] [address] [facility]  Pharmacy Notified: Yes-verified paid claim  Patient Notified: No

## 2023-05-10 ENCOUNTER — TELEPHONE (OUTPATIENT)
Dept: NEUROLOGY | Facility: CLINIC | Age: 33
End: 2023-05-10
Payer: COMMERCIAL

## 2023-05-10 NOTE — TELEPHONE ENCOUNTER
Prior Authorization Approval    Authorization Effective Date: 5/10/2023  Authorization Expiration Date: 8/10/2023  Medication: Emgality 120 mg every 28 days-PA APPROVED   Approved Dose/Quantity:   Reference #:     Insurance Company: [employer] - Phone [phone] Fax [phone]  Expected CoPay:       CoPay Card Available:      Foundation Assistance Needed:    Which Pharmacy is filling the prescription (Not needed for infusion/clinic administered): [facility] [id number] [place], [place] - [address] [facility]  Pharmacy Notified: Yes- **Instructed pharmacy to notify patient when script is ready to /ship.**  Patient Notified: Yes

## 2023-05-10 NOTE — TELEPHONE ENCOUNTER
Central Prior Authorization Team   Phone: [phone]    PA Initiation    Medication: Emgality 120 mg every 28 days  Insurance Company: [employer] - Phone [phone] Fax [phone]  Pharm[facility]illing the Rx[facility] [id number] [place], [place] - [address]  Filling Pharmacy Phone: [phone]  Filling Pharmacy Fax:    Start Date: 5/10/2023

## 2023-05-10 NOTE — TELEPHONE ENCOUNTER
Prior Authorization Retail Medication Request  Urgent    Medication/Dose: Emgality 120 mg every 28 days  ICD code (if different than what is on RX): G43.709  Previously Tried and Failed:   excedrine, She tried naratriptan, which took too long to work and some side effects of grogginess/dizziness.  The side effects were similar to her experience with rizatriptan.     Rationale: migraine  She currently has 15+/30 headache days, with 2-3/30 severe headache days per month. They rate 5-8/10.     Insurance Name: [employer]   Insurance ID:  [id number]     Pharmacy Information (if different than what is on RX)  Name:    Phone:

## 2023-05-30 ENCOUNTER — LAB REQUISITION (OUTPATIENT)
Dept: LAB | Facility: CLINIC | Age: 33
End: 2023-05-30

## 2023-05-30 DIAGNOSIS — Z01.419 ENCOUNTER FOR GYNECOLOGICAL EXAMINATION (GENERAL) (ROUTINE) WITHOUT ABNORMAL FINDINGS: ICD-10-CM

## 2023-05-30 PROCEDURE — 87624 HPV HI-RISK TYP POOLED RSLT: CPT | Performed by: FAMILY MEDICINE

## 2023-05-30 PROCEDURE — G0145 SCR C/V CYTO,THINLAYER,RESCR: HCPCS | Performed by: FAMILY MEDICINE

## 2023-06-01 LAB
BKR LAB AP GYN ADEQUACY: NORMAL
BKR LAB AP GYN INTERPRETATION: NORMAL
BKR LAB AP HPV REFLEX: NORMAL
BKR LAB AP PREVIOUS ABNORMAL: NORMAL
PATH REPORT.COMMENTS IMP SPEC: NORMAL
PATH REPORT.COMMENTS IMP SPEC: NORMAL
PATH REPORT.RELEVANT HX SPEC: NORMAL

## 2023-06-04 ENCOUNTER — HEALTH MAINTENANCE LETTER (OUTPATIENT)
Age: 33
End: 2023-06-04

## 2023-06-05 LAB
HUMAN PAPILLOMA VIRUS 16 DNA: NEGATIVE
HUMAN PAPILLOMA VIRUS 18 DNA: NEGATIVE
HUMAN PAPILLOMA VIRUS FINAL DIAGNOSIS: NORMAL
HUMAN PAPILLOMA VIRUS OTHER HR: NEGATIVE

## 2023-09-01 ENCOUNTER — TELEPHONE (OUTPATIENT)
Dept: NEUROLOGY | Facility: CLINIC | Age: 33
End: 2023-09-01
Payer: COMMERCIAL

## 2023-09-01 NOTE — TELEPHONE ENCOUNTER
Prior Authorization Retail Medication Request    Medication/Dose: galcanezumab-gnlm (EMGALITY) 120 MG/ML injection 1 mL 11 2/8/2023  No  Sig - Route: Inject 1 mL (120 mg) Subcutaneous every 28 days - Subcutaneous      ICD code (if different than what is on RX):    G43.709  Previously Tried and Failed:   excedrine, She tried naratriptan, which took too long to work and some side effects of grogginess/dizziness.  The side effects were similar to her experience with rizatriptan.      Rationale: migraine- Emgality renewal.     Insurance Name: [employer]   Insurance ID:  [id number]      Pharmacy Information (if different than what is on RX)  Name:    Phone:

## 2023-09-05 NOTE — TELEPHONE ENCOUNTER
PA Initiation    Medication: EMGALITY 120 MG/ML SC SOAJ  Insurance Company: [facility] - Phone [phone] Fax [phone]  Pharm[facility] [id number] [place], [place] [address] [facility]  Filling Pharmacy Phone: [phone]  Filling Pharmacy Fax: [phone]  Start Date: 9/5/2023

## 2023-09-06 NOTE — TELEPHONE ENCOUNTER
Prior Authorization Approval    Medication: EMGALITY 120 MG/ML SC SOAJ  Authorization Effective Date: 9/5/2023  Authorization Expiration Date: 9/5/2024  Approved Dose/Quantity:   Reference #:     Insurance Company: [facility] - Phone [phone] Fax [phone]  Expected CoPay:       CoPay Card Available:      Financial Assistance Needed:   Which Pharmacy is filling the prescription[facility] [id number] [place] [facility] [address] [facility] [place]  Pharmacy Notified: Yes  Patient Notified: Yes **Instructed pharmacy to notify patient when script is ready to /ship.**

## 2024-02-11 ENCOUNTER — APPOINTMENT (OUTPATIENT)
Dept: ULTRASOUND IMAGING | Facility: CLINIC | Age: 34
DRG: 921 | End: 2024-02-11
Attending: EMERGENCY MEDICINE
Payer: COMMERCIAL

## 2024-02-11 ENCOUNTER — HOSPITAL ENCOUNTER (INPATIENT)
Facility: CLINIC | Age: 34
LOS: 1 days | Discharge: HOME | DRG: 921 | End: 2024-02-11
Attending: EMERGENCY MEDICINE | Admitting: OBSTETRICS & GYNECOLOGY
Payer: COMMERCIAL

## 2024-02-11 ENCOUNTER — APPOINTMENT (OUTPATIENT)
Dept: CT IMAGING | Facility: CLINIC | Age: 34
DRG: 921 | End: 2024-02-11
Attending: EMERGENCY MEDICINE
Payer: COMMERCIAL

## 2024-02-11 VITALS
DIASTOLIC BLOOD PRESSURE: 59 MMHG | SYSTOLIC BLOOD PRESSURE: 102 MMHG | TEMPERATURE: 98.4 F | RESPIRATION RATE: 18 BRPM | HEART RATE: 76 BPM | HEIGHT: 62 IN | OXYGEN SATURATION: 95 % | BODY MASS INDEX: 23.55 KG/M2 | WEIGHT: 128 LBS

## 2024-02-11 DIAGNOSIS — R10.31 RLQ ABDOMINAL PAIN: ICD-10-CM

## 2024-02-11 DIAGNOSIS — N98.1 OVARIAN HYPERSTIMULATION SYNDROME: ICD-10-CM

## 2024-02-11 PROBLEM — N97.9 INFERTILITY, FEMALE: Status: ACTIVE | Noted: 2024-02-11

## 2024-02-11 LAB
ALBUMIN SERPL BCG-MCNC: 3.2 G/DL (ref 3.5–5.2)
ALBUMIN SERPL BCG-MCNC: 3.4 G/DL (ref 3.5–5.2)
ALBUMIN UR-MCNC: 30 MG/DL
ALP SERPL-CCNC: 39 U/L (ref 40–150)
ALP SERPL-CCNC: 56 U/L (ref 40–150)
ALT SERPL W P-5'-P-CCNC: <5 U/L (ref 0–50)
ALT SERPL W P-5'-P-CCNC: <5 U/L (ref 0–50)
ANION GAP SERPL CALCULATED.3IONS-SCNC: 11 MMOL/L (ref 7–15)
ANION GAP SERPL CALCULATED.3IONS-SCNC: 9 MMOL/L (ref 7–15)
APPEARANCE UR: CLEAR
AST SERPL W P-5'-P-CCNC: 20 U/L (ref 0–45)
AST SERPL W P-5'-P-CCNC: 23 U/L (ref 0–45)
BASOPHILS # BLD AUTO: 0 10E3/UL (ref 0–0.2)
BASOPHILS # BLD AUTO: 0 10E3/UL (ref 0–0.2)
BASOPHILS NFR BLD AUTO: 0 %
BASOPHILS NFR BLD AUTO: 0 %
BILIRUB DIRECT SERPL-MCNC: <0.2 MG/DL (ref 0–0.3)
BILIRUB DIRECT SERPL-MCNC: <0.2 MG/DL (ref 0–0.3)
BILIRUB SERPL-MCNC: 0.3 MG/DL
BILIRUB SERPL-MCNC: 0.3 MG/DL
BILIRUB UR QL STRIP: NEGATIVE
BUN SERPL-MCNC: 13.5 MG/DL (ref 6–20)
BUN SERPL-MCNC: 20.3 MG/DL (ref 6–20)
CALCIUM SERPL-MCNC: 8.2 MG/DL (ref 8.6–10)
CALCIUM SERPL-MCNC: 8.7 MG/DL (ref 8.6–10)
CAOX CRY #/AREA URNS HPF: ABNORMAL /HPF
CHLORIDE SERPL-SCNC: 105 MMOL/L (ref 98–107)
CHLORIDE SERPL-SCNC: 108 MMOL/L (ref 98–107)
COLOR UR AUTO: YELLOW
CREAT SERPL-MCNC: 0.75 MG/DL (ref 0.51–0.95)
CREAT SERPL-MCNC: 0.9 MG/DL (ref 0.51–0.95)
DEPRECATED HCO3 PLAS-SCNC: 19 MMOL/L (ref 22–29)
DEPRECATED HCO3 PLAS-SCNC: 22 MMOL/L (ref 22–29)
EGFRCR SERPLBLD CKD-EPI 2021: 85 ML/MIN/1.73M2
EGFRCR SERPLBLD CKD-EPI 2021: >90 ML/MIN/1.73M2
EOSINOPHIL # BLD AUTO: 0 10E3/UL (ref 0–0.7)
EOSINOPHIL # BLD AUTO: 0 10E3/UL (ref 0–0.7)
EOSINOPHIL NFR BLD AUTO: 0 %
EOSINOPHIL NFR BLD AUTO: 0 %
ERYTHROCYTE [DISTWIDTH] IN BLOOD BY AUTOMATED COUNT: 12.2 % (ref 10–15)
ERYTHROCYTE [DISTWIDTH] IN BLOOD BY AUTOMATED COUNT: 12.3 % (ref 10–15)
GLUCOSE SERPL-MCNC: 114 MG/DL (ref 70–99)
GLUCOSE SERPL-MCNC: 96 MG/DL (ref 70–99)
GLUCOSE UR STRIP-MCNC: NEGATIVE MG/DL
HCG SERPL QL: NEGATIVE
HCT VFR BLD AUTO: 39 % (ref 35–47)
HCT VFR BLD AUTO: 45.9 % (ref 35–47)
HGB BLD-MCNC: 13.3 G/DL (ref 11.7–15.7)
HGB BLD-MCNC: 15.5 G/DL (ref 11.7–15.7)
HGB UR QL STRIP: NEGATIVE
IMM GRANULOCYTES # BLD: 0.1 10E3/UL
IMM GRANULOCYTES # BLD: 0.1 10E3/UL
IMM GRANULOCYTES NFR BLD: 0 %
IMM GRANULOCYTES NFR BLD: 0 %
KETONES UR STRIP-MCNC: 100 MG/DL
LEUKOCYTE ESTERASE UR QL STRIP: ABNORMAL
LIPASE SERPL-CCNC: 24 U/L (ref 13–60)
LYMPHOCYTES # BLD AUTO: 1.4 10E3/UL (ref 0.8–5.3)
LYMPHOCYTES # BLD AUTO: 1.9 10E3/UL (ref 0.8–5.3)
LYMPHOCYTES NFR BLD AUTO: 13 %
LYMPHOCYTES NFR BLD AUTO: 9 %
MCH RBC QN AUTO: 29.6 PG (ref 26.5–33)
MCH RBC QN AUTO: 30.2 PG (ref 26.5–33)
MCHC RBC AUTO-ENTMCNC: 33.8 G/DL (ref 31.5–36.5)
MCHC RBC AUTO-ENTMCNC: 34.1 G/DL (ref 31.5–36.5)
MCV RBC AUTO: 88 FL (ref 78–100)
MCV RBC AUTO: 88 FL (ref 78–100)
MONOCYTES # BLD AUTO: 0.5 10E3/UL (ref 0–1.3)
MONOCYTES # BLD AUTO: 0.7 10E3/UL (ref 0–1.3)
MONOCYTES NFR BLD AUTO: 3 %
MONOCYTES NFR BLD AUTO: 5 %
MUCOUS THREADS #/AREA URNS LPF: PRESENT /LPF
NEUTROPHILS # BLD AUTO: 11.7 10E3/UL (ref 1.6–8.3)
NEUTROPHILS # BLD AUTO: 12.9 10E3/UL (ref 1.6–8.3)
NEUTROPHILS NFR BLD AUTO: 82 %
NEUTROPHILS NFR BLD AUTO: 88 %
NITRATE UR QL: NEGATIVE
NRBC # BLD AUTO: 0 10E3/UL
NRBC # BLD AUTO: 0 10E3/UL
NRBC BLD AUTO-RTO: 0 /100
NRBC BLD AUTO-RTO: 0 /100
PH UR STRIP: 5.5 [PH] (ref 5–7)
PLATELET # BLD AUTO: 238 10E3/UL (ref 150–450)
PLATELET # BLD AUTO: 279 10E3/UL (ref 150–450)
POTASSIUM SERPL-SCNC: 4.5 MMOL/L (ref 3.4–5.3)
POTASSIUM SERPL-SCNC: 5 MMOL/L (ref 3.4–5.3)
PROT SERPL-MCNC: 5.2 G/DL (ref 6.4–8.3)
PROT SERPL-MCNC: 6.4 G/DL (ref 6.4–8.3)
RBC # BLD AUTO: 4.41 10E6/UL (ref 3.8–5.2)
RBC # BLD AUTO: 5.23 10E6/UL (ref 3.8–5.2)
RBC URINE: 1 /HPF
SODIUM SERPL-SCNC: 136 MMOL/L (ref 135–145)
SODIUM SERPL-SCNC: 138 MMOL/L (ref 135–145)
SP GR UR STRIP: 1.03 (ref 1–1.03)
SQUAMOUS EPITHELIAL: 4 /HPF
UROBILINOGEN UR STRIP-MCNC: <2 MG/DL
WBC # BLD AUTO: 14.3 10E3/UL (ref 4–11)
WBC # BLD AUTO: 14.9 10E3/UL (ref 4–11)
WBC URINE: 6 /HPF

## 2024-02-11 PROCEDURE — 120N000001 HC R&B MED SURG/OB

## 2024-02-11 PROCEDURE — 250N000011 HC RX IP 250 OP 636: Performed by: OBSTETRICS & GYNECOLOGY

## 2024-02-11 PROCEDURE — 96361 HYDRATE IV INFUSION ADD-ON: CPT

## 2024-02-11 PROCEDURE — 84703 CHORIONIC GONADOTROPIN ASSAY: CPT | Performed by: EMERGENCY MEDICINE

## 2024-02-11 PROCEDURE — 250N000013 HC RX MED GY IP 250 OP 250 PS 637: Performed by: OBSTETRICS & GYNECOLOGY

## 2024-02-11 PROCEDURE — 36415 COLL VENOUS BLD VENIPUNCTURE: CPT | Performed by: EMERGENCY MEDICINE

## 2024-02-11 PROCEDURE — 85025 COMPLETE CBC W/AUTO DIFF WBC: CPT | Performed by: OBSTETRICS & GYNECOLOGY

## 2024-02-11 PROCEDURE — 82248 BILIRUBIN DIRECT: CPT | Performed by: OBSTETRICS & GYNECOLOGY

## 2024-02-11 PROCEDURE — 83690 ASSAY OF LIPASE: CPT | Performed by: EMERGENCY MEDICINE

## 2024-02-11 PROCEDURE — 258N000003 HC RX IP 258 OP 636: Performed by: EMERGENCY MEDICINE

## 2024-02-11 PROCEDURE — 36415 COLL VENOUS BLD VENIPUNCTURE: CPT | Performed by: OBSTETRICS & GYNECOLOGY

## 2024-02-11 PROCEDURE — 99285 EMERGENCY DEPT VISIT HI MDM: CPT | Mod: 25

## 2024-02-11 PROCEDURE — 96375 TX/PRO/DX INJ NEW DRUG ADDON: CPT

## 2024-02-11 PROCEDURE — 250N000011 HC RX IP 250 OP 636: Performed by: EMERGENCY MEDICINE

## 2024-02-11 PROCEDURE — 76856 US EXAM PELVIC COMPLETE: CPT

## 2024-02-11 PROCEDURE — 76830 TRANSVAGINAL US NON-OB: CPT

## 2024-02-11 PROCEDURE — 80048 BASIC METABOLIC PNL TOTAL CA: CPT | Performed by: OBSTETRICS & GYNECOLOGY

## 2024-02-11 PROCEDURE — 81001 URINALYSIS AUTO W/SCOPE: CPT | Performed by: EMERGENCY MEDICINE

## 2024-02-11 PROCEDURE — 74177 CT ABD & PELVIS W/CONTRAST: CPT

## 2024-02-11 PROCEDURE — 82040 ASSAY OF SERUM ALBUMIN: CPT | Performed by: EMERGENCY MEDICINE

## 2024-02-11 PROCEDURE — 85025 COMPLETE CBC W/AUTO DIFF WBC: CPT | Performed by: EMERGENCY MEDICINE

## 2024-02-11 PROCEDURE — 96374 THER/PROPH/DIAG INJ IV PUSH: CPT | Mod: 59

## 2024-02-11 PROCEDURE — 80048 BASIC METABOLIC PNL TOTAL CA: CPT | Performed by: EMERGENCY MEDICINE

## 2024-02-11 PROCEDURE — 80053 COMPREHEN METABOLIC PANEL: CPT | Performed by: EMERGENCY MEDICINE

## 2024-02-11 RX ORDER — DEXTROSE, SODIUM CHLORIDE, SODIUM LACTATE, POTASSIUM CHLORIDE, AND CALCIUM CHLORIDE 5; .6; .31; .03; .02 G/100ML; G/100ML; G/100ML; G/100ML; G/100ML
INJECTION, SOLUTION INTRAVENOUS CONTINUOUS
Status: DISCONTINUED | OUTPATIENT
Start: 2024-02-11 | End: 2024-02-11 | Stop reason: HOSPADM

## 2024-02-11 RX ORDER — ONDANSETRON 2 MG/ML
4 INJECTION INTRAMUSCULAR; INTRAVENOUS ONCE
Status: COMPLETED | OUTPATIENT
Start: 2024-02-11 | End: 2024-02-11

## 2024-02-11 RX ORDER — PROCHLORPERAZINE MALEATE 10 MG
10 TABLET ORAL EVERY 6 HOURS PRN
Status: DISCONTINUED | OUTPATIENT
Start: 2024-02-11 | End: 2024-02-11 | Stop reason: HOSPADM

## 2024-02-11 RX ORDER — AMOXICILLIN 250 MG
1 CAPSULE ORAL 2 TIMES DAILY
Status: DISCONTINUED | OUTPATIENT
Start: 2024-02-11 | End: 2024-02-11 | Stop reason: HOSPADM

## 2024-02-11 RX ORDER — ENOXAPARIN SODIUM 100 MG/ML
40 INJECTION SUBCUTANEOUS EVERY 24 HOURS
Status: DISCONTINUED | OUTPATIENT
Start: 2024-02-11 | End: 2024-02-11

## 2024-02-11 RX ORDER — HYDROXYZINE HYDROCHLORIDE 25 MG/1
25 TABLET, FILM COATED ORAL EVERY 6 HOURS PRN
Status: DISCONTINUED | OUTPATIENT
Start: 2024-02-11 | End: 2024-02-11 | Stop reason: HOSPADM

## 2024-02-11 RX ORDER — IOPAMIDOL 755 MG/ML
100 INJECTION, SOLUTION INTRAVASCULAR ONCE
Status: COMPLETED | OUTPATIENT
Start: 2024-02-11 | End: 2024-02-11

## 2024-02-11 RX ORDER — MORPHINE SULFATE 4 MG/ML
4 INJECTION, SOLUTION INTRAMUSCULAR; INTRAVENOUS ONCE
Status: COMPLETED | OUTPATIENT
Start: 2024-02-11 | End: 2024-02-11

## 2024-02-11 RX ORDER — POLYETHYLENE GLYCOL 3350 17 G/17G
17 POWDER, FOR SOLUTION ORAL DAILY
Status: DISCONTINUED | OUTPATIENT
Start: 2024-02-11 | End: 2024-02-11 | Stop reason: HOSPADM

## 2024-02-11 RX ORDER — UBIDECARENONE 100 MG
100 CAPSULE ORAL DAILY
COMMUNITY

## 2024-02-11 RX ORDER — ONDANSETRON 4 MG/1
4 TABLET, ORALLY DISINTEGRATING ORAL EVERY 6 HOURS PRN
Status: DISCONTINUED | OUTPATIENT
Start: 2024-02-11 | End: 2024-02-11 | Stop reason: HOSPADM

## 2024-02-11 RX ORDER — NALOXONE HYDROCHLORIDE 0.4 MG/ML
0.4 INJECTION, SOLUTION INTRAMUSCULAR; INTRAVENOUS; SUBCUTANEOUS
Status: DISCONTINUED | OUTPATIENT
Start: 2024-02-11 | End: 2024-02-11 | Stop reason: HOSPADM

## 2024-02-11 RX ORDER — ACETAMINOPHEN 325 MG/1
975 TABLET ORAL EVERY 8 HOURS PRN
Status: DISCONTINUED | OUTPATIENT
Start: 2024-02-11 | End: 2024-02-11 | Stop reason: HOSPADM

## 2024-02-11 RX ORDER — AMOXICILLIN 250 MG
2 CAPSULE ORAL 2 TIMES DAILY
Status: DISCONTINUED | OUTPATIENT
Start: 2024-02-11 | End: 2024-02-11 | Stop reason: HOSPADM

## 2024-02-11 RX ORDER — OXYCODONE HYDROCHLORIDE 5 MG/1
5 TABLET ORAL EVERY 4 HOURS PRN
Qty: 12 TABLET | Refills: 0 | Status: SHIPPED | OUTPATIENT
Start: 2024-02-11

## 2024-02-11 RX ORDER — OXYCODONE HYDROCHLORIDE 5 MG/1
5 TABLET ORAL EVERY 6 HOURS PRN
COMMUNITY
Start: 2024-02-07

## 2024-02-11 RX ORDER — NALOXONE HYDROCHLORIDE 0.4 MG/ML
0.2 INJECTION, SOLUTION INTRAMUSCULAR; INTRAVENOUS; SUBCUTANEOUS
Status: DISCONTINUED | OUTPATIENT
Start: 2024-02-11 | End: 2024-02-11 | Stop reason: HOSPADM

## 2024-02-11 RX ORDER — PROCHLORPERAZINE 25 MG
25 SUPPOSITORY, RECTAL RECTAL EVERY 12 HOURS PRN
Status: DISCONTINUED | OUTPATIENT
Start: 2024-02-11 | End: 2024-02-11 | Stop reason: HOSPADM

## 2024-02-11 RX ORDER — OXYCODONE HYDROCHLORIDE 5 MG/1
5 TABLET ORAL EVERY 4 HOURS PRN
Status: DISCONTINUED | OUTPATIENT
Start: 2024-02-11 | End: 2024-02-11 | Stop reason: HOSPADM

## 2024-02-11 RX ORDER — SODIUM CHLORIDE 9 MG/ML
INJECTION, SOLUTION INTRAVENOUS CONTINUOUS
Status: DISCONTINUED | OUTPATIENT
Start: 2024-02-11 | End: 2024-02-11 | Stop reason: HOSPADM

## 2024-02-11 RX ORDER — IBUPROFEN 600 MG/1
600 TABLET, FILM COATED ORAL EVERY 6 HOURS PRN
Status: DISCONTINUED | OUTPATIENT
Start: 2024-02-11 | End: 2024-02-11 | Stop reason: HOSPADM

## 2024-02-11 RX ORDER — ONDANSETRON 2 MG/ML
4 INJECTION INTRAMUSCULAR; INTRAVENOUS EVERY 6 HOURS PRN
Status: DISCONTINUED | OUTPATIENT
Start: 2024-02-11 | End: 2024-02-11 | Stop reason: HOSPADM

## 2024-02-11 RX ORDER — ONDANSETRON 4 MG/1
4 TABLET, ORALLY DISINTEGRATING ORAL EVERY 8 HOURS PRN
Qty: 30 TABLET | Refills: 0 | Status: SHIPPED | OUTPATIENT
Start: 2024-02-11

## 2024-02-11 RX ADMIN — OXYCODONE HYDROCHLORIDE 5 MG: 5 TABLET ORAL at 12:02

## 2024-02-11 RX ADMIN — IBUPROFEN 600 MG: 600 TABLET ORAL at 08:45

## 2024-02-11 RX ADMIN — SENNOSIDES AND DOCUSATE SODIUM 1 TABLET: 8.6; 5 TABLET ORAL at 09:00

## 2024-02-11 RX ADMIN — ONDANSETRON 4 MG: 2 INJECTION INTRAMUSCULAR; INTRAVENOUS at 01:08

## 2024-02-11 RX ADMIN — IOPAMIDOL 100 ML: 755 INJECTION, SOLUTION INTRAVENOUS at 01:56

## 2024-02-11 RX ADMIN — SODIUM CHLORIDE 1000 ML: 9 INJECTION, SOLUTION INTRAVENOUS at 01:09

## 2024-02-11 RX ADMIN — MORPHINE SULFATE 4 MG: 4 INJECTION, SOLUTION INTRAMUSCULAR; INTRAVENOUS at 03:10

## 2024-02-11 RX ADMIN — Medication: at 05:50

## 2024-02-11 RX ADMIN — POLYETHYLENE GLYCOL 3350 17 G: 17 POWDER, FOR SOLUTION ORAL at 12:02

## 2024-02-11 RX ADMIN — MORPHINE SULFATE 4 MG: 4 INJECTION, SOLUTION INTRAMUSCULAR; INTRAVENOUS at 01:07

## 2024-02-11 RX ADMIN — SODIUM CHLORIDE, SODIUM LACTATE, POTASSIUM CHLORIDE, CALCIUM CHLORIDE AND DEXTROSE MONOHYDRATE: 5; 600; 310; 30; 20 INJECTION, SOLUTION INTRAVENOUS at 05:52

## 2024-02-11 RX ADMIN — ACETAMINOPHEN 975 MG: 325 TABLET ORAL at 11:06

## 2024-02-11 ASSESSMENT — ACTIVITIES OF DAILY LIVING (ADL)
ADLS_ACUITY_SCORE: 35

## 2024-02-11 NOTE — PLAN OF CARE
Problem: Adult Inpatient Plan of Care  Goal: Absence of Hospital-Acquired Illness or Injury  Intervention: Identify and Manage Fall Risk  Recent Flowsheet Documentation  Taken 2/11/2024 0800 by Portia Garcia RN  Safety Promotion/Fall Prevention:   activity supervised   assistive device/personal items within reach   clutter free environment maintained   nonskid shoes/slippers when out of bed  Intervention: Prevent Skin Injury  Recent Flowsheet Documentation  Taken 2/11/2024 0845 by Portia Garcia RN  Body Position: position changed independently  Taken 2/11/2024 0800 by Portia Garcia RN  Body Position: position changed independently     Problem: Pain Acute  Goal: Optimal Pain Control and Function  Outcome: Progressing  Intervention: Prevent or Manage Pain  Recent Flowsheet Documentation  Taken 2/11/2024 0800 by Portia Garcia RN  Bowel Elimination Promotion: adequate fluid intake promoted  Medication Review/Management: medications reviewed   Goal Outcome Evaluation:    Pt alert and oriented and able to make all needs known.  Up independently in room.  Voiding.  Tolerating diet.  IV infusing.  Pain controlled with ibuprofen.   at the bedside.  Involved and supportive.  Pt to hopefully discharge later today.

## 2024-02-11 NOTE — ED TRIAGE NOTES
Pt presents with complaints of R sided abdominal pain that was sudden onset around 2100. Egg retrieval was performed on Wednesday. Pt has vomited 3x since pain began. Pt guarding R side.

## 2024-02-11 NOTE — PROGRESS NOTES
Sent page and spoke with MD Early [facility] to let know pt is on the floor and need orders for stay for observation and due to pain 9/10.

## 2024-02-11 NOTE — ED PROVIDER NOTES
EMERGENCY DEPARTMENT ENCOUNTER      NAME: Tessa Aly  AGE: 33 year old female  YOB: 1990  MRN: [id number]  EVALUATION DATE & TIME: 2/11/2024 12:45 AM    PCP: Anya Sutherland    ED PROVIDER: Peace Lyman M.D.      Chief Complaint   Patient presents with    Abdominal Pain         FINAL IMPRESSION:  1. RLQ abdominal pain    2. Ovarian hyperstimulation syndrome          ED COURSE & MEDICAL DECISION MAKING:    ED Course as of 02/11/24 0411   Sun Feb 11, 2024   0057 Patient with acute RLQ abdominal pain, no urinary sytmpoms, no prior surgeries, sp egg retrieval 2 days ago and with high estrogen levels. Concern for ovarian torsion or ovarian hyperstimulation, pending stat CT abodmen, pelvic US, [place] called [place] [phone] without answer at this time, stat zofran, morphine and IVF begun and LFTs/lipase, hcg and CBC/BMP underway, will again call imaging for stat US   0129 I called US x2 without pickup, asked charge RN to assist   0129 Pt in US now   0233 CBC and chemistry WNL, with negative hcg, normal LFTs and lipase.    0234 CT and US with likely ovarian hyperstimulation syndrome with cyst rupture, with likely moderate ohSS with some fluid in pelvis, right ovary over 10cmc, abdominal pain and nausea/vomiting vs. Severe OHSS with mild leukocytosis with WBC 14. Creatinine 0.9 reassuringly. No critical oHSS at this point. No significant comorbidities detected with normal creatinine, LFTs, no encephalopathy/AMS or pulmonary symptoms. US without signs of ovarian torsion reassuringly.    0242 Pt reassessed and feeling improved, follows with [facility] and [facility], no obgyn, obgyn staffing team here paged along with hospitalist   0243 I spoke with Dr Early who will see patient while admitted from obgyn and consult on case   0252 Hospitalist Dr Marcus declines admission noting he thinks it should be primarily managed by obgyn, obgyn paged to discuss admission plan   0254 I spoke wtih Dr Early again who will accept  "admission to obgyn service       Pertinent Labs & Imaging studies reviewed. (See chart for details)    N95 worn  A face shield was worn also  COVID PPE    Medical Decision Making  Obtained supplemental history:Supplemental history obtained?: Documented in chart and Family Member/Significant Other  Reviewed external records: External records reviewed?: Documented in chart  Care impacted by chronic illness:N/A  Care significantly affected by social determinants of health:N/A  Did you consider but not order tests?: Work up considered but not performed and documented in chart, if applicable  Did you interpret images independently?: Independent interpretation of ECG and images noted in documentation, when applicable.  Consultation discussion with other provider:Did you involve another provider (consultant, , pharmacy, etc.)?: I discussed the care with another health care provider, see documentation for details.  Admit.    At the conclusion of the encounter I discussed the results of all of the tests and the disposition. The questions were answered. The patient or family acknowledged understanding and was agreeable with the care plan.     MEDICATIONS GIVEN IN THE EMERGENCY:  Medications   morphine (PF) injection 4 mg (4 mg Intravenous $Given 2/11/24 0107)   sodium chloride 0.9% BOLUS 1,000 mL (0 mLs Intravenous Stopped 2/11/24 0326)   ondansetron (ZOFRAN) injection 4 mg (4 mg Intravenous $Given 2/11/24 0108)   iopamidol (ISOVUE-370) solution 100 mL (100 mLs Intravenous $Given 2/11/24 0156)   morphine (PF) injection 4 mg (4 mg Intravenous $Given 2/11/24 0310)       NEW PRESCRIPTIONS STARTED AT TODAY'S ER VISIT  New Prescriptions    No medications on file          =================================================================    HPI      Tessa Aly is a 33 year old female with PMHx of infertility and status post egg retrieval on Thursday 2 days ago with \"high estrogen levels\" who presents to the ED today via private " "vehicle BIB her  with abdominal pain.    She reports sudden onset right lower quadrant anterior abdominal pain nonradiating since 2100 tonight, sudden onset, sharp and 10/10 with associated nausea, vomiting x3. No analgesic therapy taken. She reports her estrogen levels were high at [facility] in [place] and they warned her she could experience ovarian hyperstimulation.       REVIEW OF SYSTEMS   All other systems reviewed and are negative except as noted above in HPI.    PAST MEDICAL HISTORY:  No past medical history on file.    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    SOCIAL HISTORY:   Social History     Socioeconomic History    Marital status:    Tobacco Use    Smoking status: Never    Smokeless tobacco: Never   Substance and Sexual Activity    Alcohol use: Not Currently       VITALS:  Patient Vitals for the past 24 hrs:   BP Temp Temp src Pulse Resp SpO2 Height Weight   02/11/24 0047 (!) 142/90 97.9  F (36.6  C) Oral 95 20 100 % 1.575 m (5' 2\") 58.1 kg (128 lb)       PHYSICAL EXAM    GENERAL: Awake, alert.  Uncomfortable appearing.  HEENT: Normocephalic, atraumatic.  Pupils equal, round and reactive.  Conjunctiva normal.  EOMI.  NECK: No stridor or apparent deformity.  PULMONARY: Symmetrical breath sounds without distress.  Lungs clear to auscultation bilaterally without wheezes, rhonchi or rales.  CARDIO: Regular rate and rhythm.  No significant murmur, rub or gallop.  Radial pulses strong and symmetrical.  ABDOMINAL: Abdomen soft, non-distended but RLQ tender to " palpation.  No CVAT, no palpable hepatosplenomegaly.  EXTREMITIES: No lower extremity swelling or edema.    NEURO: Alert and oriented to person, place and time.  Cranial nerves grossly intact.  No focal motor deficit.  PSYCH: Normal mood and affect  SKIN: No rashes      LAB:  All pertinent labs reviewed and interpreted.  Results for orders placed or performed during the hospital encounter of 02/11/24   US Pelvic Complete with Transvaginal    Impression    IMPRESSION:  1.  Findings consistent with ovarian hyperstimulation syndrome, with complex fluid throughout the pelvis presumably related to blood products.         CT Abdomen Pelvis w Contrast    Impression    IMPRESSION:   1.  Bilateral ovarian enlargement with numerous enlarged cysts/follicles, some of which contain internal hemorrhagic or proteinaceous debris. There is small volume abdominopelvic ascites which appears simple. Findings compatible with cyst rupture in   setting of ovarian hyperstimulation syndrome.   Basic metabolic panel   Result Value Ref Range    Sodium 138 135 - 145 mmol/L    Potassium 4.5 3.4 - 5.3 mmol/L    Chloride 105 98 - 107 mmol/L    Carbon Dioxide (CO2) 22 22 - 29 mmol/L    Anion Gap 11 7 - 15 mmol/L    Urea Nitrogen 20.3 (H) 6.0 - 20.0 mg/dL    Creatinine 0.90 0.51 - 0.95 mg/dL    GFR Estimate 85 >60 mL/min/1.73m2    Calcium 8.7 8.6 - 10.0 mg/dL    Glucose 114 (H) 70 - 99 mg/dL   Result Value Ref Range    Lipase 24 13 - 60 U/L   Hepatic function panel   Result Value Ref Range    Protein Total 6.4 6.4 - 8.3 g/dL    Albumin 3.4 (L) 3.5 - 5.2 g/dL    Bilirubin Total 0.3 <=1.2 mg/dL    Alkaline Phosphatase 56 40 - 150 U/L    AST 23 0 - 45 U/L    ALT <5 0 - 50 U/L    Bilirubin Direct <0.20 0.00 - 0.30 mg/dL   UA with Microscopic reflex to Culture    Specimen: Urine, Clean Catch   Result Value Ref Range    Color Urine Yellow Colorless, Straw, Light Yellow, Yellow    Appearance Urine Clear Clear    Glucose Urine Negative Negative mg/dL     Bilirubin Urine Negative Negative    Ketones Urine 100 (A) Negative mg/dL    Specific Gravity Urine 1.035 (H) 1.001 - 1.030    Blood Urine Negative Negative    pH Urine 5.5 5.0 - 7.0    Protein Albumin Urine 30 (A) Negative mg/dL    Urobilinogen Urine <2.0 <2.0 mg/dL    Nitrite Urine Negative Negative    Leukocyte Esterase Urine 75 Derek/uL (A) Negative    Mucus Urine Present (A) None Seen /LPF    Calcium Oxalate Crystals Urine Few (A) None Seen /HPF    RBC Urine 1 <=2 /HPF    WBC Urine 6 (H) <=5 /HPF    Squamous Epithelials Urine 4 (H) <=1 /HPF   HCG qualitative Blood   Result Value Ref Range    hCG Serum Qualitative Negative Negative   CBC with platelets and differential   Result Value Ref Range    WBC Count 14.3 (H) 4.0 - 11.0 10e3/uL    RBC Count 5.23 (H) 3.80 - 5.20 10e6/uL    Hemoglobin 15.5 11.7 - 15.7 g/dL    Hematocrit 45.9 35.0 - 47.0 %    MCV 88 78 - 100 fL    MCH 29.6 26.5 - 33.0 pg    MCHC 33.8 31.5 - 36.5 g/dL    RDW 12.3 10.0 - 15.0 %    Platelet Count 279 150 - 450 10e3/uL    % Neutrophils 82 %    % Lymphocytes 13 %    % Monocytes 5 %    % Eosinophils 0 %    % Basophils 0 %    % Immature Granulocytes 0 %    NRBCs per 100 WBC 0 <1 /100    Absolute Neutrophils 11.7 (H) 1.6 - 8.3 10e3/uL    Absolute Lymphocytes 1.9 0.8 - 5.3 10e3/uL    Absolute Monocytes 0.7 0.0 - 1.3 10e3/uL    Absolute Eosinophils 0.0 0.0 - 0.7 10e3/uL    Absolute Basophils 0.0 0.0 - 0.2 10e3/uL    Absolute Immature Granulocytes 0.1 <=0.4 10e3/uL    Absolute NRBCs 0.0 10e3/uL       RADIOLOGY:  Reviewed all pertinent imaging. Please see official radiology report.  US Pelvic Complete with Transvaginal   Final Result   IMPRESSION:   1.  Findings consistent with ovarian hyperstimulation syndrome, with complex fluid throughout the pelvis presumably related to blood products.               CT Abdomen Pelvis w Contrast   Final Result   IMPRESSION:    1.  Bilateral ovarian enlargement with numerous enlarged cysts/follicles, some of which  contain internal hemorrhagic or proteinaceous debris. There is small volume abdominopelvic ascites which appears simple. Findings compatible with cyst rupture in    setting of ovarian hyperstimulation syndrome.             Peace Lyman MD  02/11/24 7712

## 2024-02-11 NOTE — PLAN OF CARE
PCA pump set up. Clinician dose of 0.2 mg given for pain. VSS at this time. Pt A +O x4. Able to make needs via call light. Bed alarm on. On Clears diet.   Pain intermittent.     Problem: Adult Inpatient Plan of Care  Goal: Optimal Comfort and Wellbeing  Outcome: Progressing  Goal: Readiness for Transition of Care  Outcome: Progressing     Problem: Pain Acute  Goal: Optimal Pain Control and Function  Outcome: Progressing  Intervention: Prevent or Manage Pain  Recent Flowsheet Documentation  Taken 2/11/2024 0500 by Tate Gaming, RN  Medication Review/Management: medications reviewed

## 2024-02-11 NOTE — PROGRESS NOTES
I was called to admit this patient    Assessment -- Abdominal pain and vomiting on a 33 year old female     Pelvic US --ovarian hyperstimulation syndrome, with complex fluid throughout the pelvis presumably related to blood products.     Abdomen CT -  cyst rupture in setting of ovarian hyperstimulation syndrome.    Plans - Discussed with ED Dr Lyman to have this patient admit under OB/GYN service as primary,     From what I see, she has a ruptured ovarian cysts with intra-abdominal blood products. If hospital med service is needed, please don't hesitate to put a consult.

## 2024-02-11 NOTE — PROGRESS NOTES
Care Management Discharge Note    Discharge Date: 02/11/2024       Discharge Disposition: Home    Discharge Services: None    Discharge DME: None    Discharge Transportation: family or friend will provide    Education Provided on the Discharge Plan: Yes (AVS per bedside RN)    Persons Notified of Discharge Plans: patient    Patient/Family in Agreement with the Plan: yes        Additional Information:  CM reviewed chart. No CM needs identified or requested. Family to provide transportation home at discharge.     Nimisha Graf RN       Detail Level: Simple Detail Level: Zone

## 2024-02-11 NOTE — PHARMACY-ADMISSION MEDICATION HISTORY
Pharmacy Intern Admission Medication History    Admission medication history is complete. The information provided in this note is only as accurate as the sources available at the time of the update.    Information Source(s): Patient and CareEverywhere/SureScripts via in-person    Pertinent Information: Emgality can't take during egg retrieval so hasn't used since december    Changes made to PTA medication list:  Added: CoQ 10, Vit.D, prenatal  Deleted: None  Changed: None    Allergies reviewed with patient and updates made in EHR: yes    Medication History Completed By: Ximena Joy 2/11/2024 7:58 AM    PTA Med List   Medication Sig Last Dose    aspirin-acetaminophen-caffeine (EXCEDRIN MIGRAINE) 250-250-65 MG tablet Take 1 tablet by mouth every 6 hours as needed for headaches Past Week    cholecalciferol (VITAMIN D3) 10 mcg (400 units) TABS tablet Take 10 mcg by mouth daily 2/6/2024    co-enzyme Q-10 100 MG CAPS capsule Take 100 mg by mouth daily 2/6/2024    eletriptan (RELPAX) 20 MG tablet Take 1 tablet (20 mg) by mouth at onset of headache for migraine (repeat in 2 hours if needed) May repeat in 2 hours. Max 2 tablets/24 hours. More than a month    galcanezumab-gnlm (EMGALITY) 120 MG/ML injection Inject 1 mL (120 mg) Subcutaneous every 28 days More than a month    oxyCODONE (ROXICODONE) 5 MG tablet Take 5 mg by mouth every 6 hours as needed for moderate to severe pain 2/10/2024 at PM    oxyCODONE (ROXICODONE) 5 MG tablet Take 1 tablet (5 mg) by mouth every 4 hours as needed for moderate pain     Prenatal Vit-Fe Fumarate-FA (PRENATAL MULTIVITAMIN  PLUS IRON) 27-1 MG TABS Take by mouth daily 2/6/2024    rizatriptan (MAXALT) 10 MG tablet Take 1 tablet (10 mg) by mouth at onset of headache for migraine (repeat in 2 hours if needed) More than a month

## 2024-02-11 NOTE — H&P
"[facility] Gynecology Consultation    Tessa Aly MRN# [id number]   Age: 33 year old YOB: 1990     Date of Admission:  2/11/2024    Reason for consult: OHSS       Requesting physician: Dr. Lyman       Level of consult: Consult, follow and place orders           Assessment and Plan:   Assessment:   Mild OHSS - electrolytes and LFTs normal over 2 checks. Pain improving with pain medications.      Plan:   Pain control with oral medications  Plenty of fluids for home  OHSS activity restrictions  Follow up with [facility] by calling their clinic Monday.     Discussed with Dr. Pugh of [facility]             Chief Complaint:   Abdominal pain     History is obtained from the patient    Tessa lAy is a 33 year old who presented to the ER with acute pain starting at 2100. Had egg retrieval on Thursday (3 days ago). Pain medications given in ER and currently patient has much better pain control except when moving. No other symptoms noted. Electrolytes and liver function normal on admission.            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\"             Social History:   This patient has no significant social history               Medications:     Current Facility-Administered Medications   Medication    acetaminophen (TYLENOL) tablet 975 mg    dextrose 5% in lactated ringers infusion    hydrOXYzine HCl (ATARAX) tablet 25 mg    ibuprofen (ADVIL/MOTRIN) tablet 600 mg    naloxone (NARCAN) injection 0.2 mg    Or    naloxone (NARCAN) injection 0.4 mg    Or    naloxone (NARCAN) injection 0.2 mg    Or    naloxone (NARCAN) injection 0.4 mg    ondansetron (ZOFRAN ODT) ODT tab 4 mg    Or    ondansetron (ZOFRAN) injection 4 mg    oxyCODONE (ROXICODONE) tablet 5 mg    polyethylene glycol (MIRALAX) Packet 17 g    prochlorperazine (COMPAZINE) injection 10 " mg    Or    prochlorperazine (COMPAZINE) tablet 10 mg    Or    prochlorperazine (COMPAZINE) suppository 25 mg    senna-docusate (SENOKOT-S/PERICOLACE) 8.6-50 MG per tablet 1 tablet    Or    senna-docusate (SENOKOT-S/PERICOLACE) 8.6-50 MG per tablet 2 tablet    sodium chloride 0.9 % infusion             Review of Systems:   The Review of Systems is negative other than noted in the HPI          Physical Exam:   Vitals were reviewed  Gen: Alert, NAD  Nonlabored breathing  RRR          Data:     Recent Results (from the past 24 hour(s))   Basic metabolic panel    Collection Time: 02/11/24  1:07 AM   Result Value Ref Range    Sodium 138 135 - 145 mmol/L    Potassium 4.5 3.4 - 5.3 mmol/L    Chloride 105 98 - 107 mmol/L    Carbon Dioxide (CO2) 22 22 - 29 mmol/L    Anion Gap 11 7 - 15 mmol/L    Urea Nitrogen 20.3 (H) 6.0 - 20.0 mg/dL    Creatinine 0.90 0.51 - 0.95 mg/dL    GFR Estimate 85 >60 mL/min/1.73m2    Calcium 8.7 8.6 - 10.0 mg/dL    Glucose 114 (H) 70 - 99 mg/dL   Lipase    Collection Time: 02/11/24  1:07 AM   Result Value Ref Range    Lipase 24 13 - 60 U/L   Hepatic function panel    Collection Time: 02/11/24  1:07 AM   Result Value Ref Range    Protein Total 6.4 6.4 - 8.3 g/dL    Albumin 3.4 (L) 3.5 - 5.2 g/dL    Bilirubin Total 0.3 <=1.2 mg/dL    Alkaline Phosphatase 56 40 - 150 U/L    AST 23 0 - 45 U/L    ALT <5 0 - 50 U/L    Bilirubin Direct <0.20 0.00 - 0.30 mg/dL   HCG qualitative Blood    Collection Time: 02/11/24  1:07 AM   Result Value Ref Range    hCG Serum Qualitative Negative Negative   CBC with platelets and differential    Collection Time: 02/11/24  1:07 AM   Result Value Ref Range    WBC Count 14.3 (H) 4.0 - 11.0 10e3/uL    RBC Count 5.23 (H) 3.80 - 5.20 10e6/uL    Hemoglobin 15.5 11.7 - 15.7 g/dL    Hematocrit 45.9 35.0 - 47.0 %    MCV 88 78 - 100 fL    MCH 29.6 26.5 - 33.0 pg    MCHC 33.8 31.5 - 36.5 g/dL    RDW 12.3 10.0 - 15.0 %    Platelet Count 279 150 - 450 10e3/uL    % Neutrophils 82 %    %  Lymphocytes 13 %    % Monocytes 5 %    % Eosinophils 0 %    % Basophils 0 %    % Immature Granulocytes 0 %    NRBCs per 100 WBC 0 <1 /100    Absolute Neutrophils 11.7 (H) 1.6 - 8.3 10e3/uL    Absolute Lymphocytes 1.9 0.8 - 5.3 10e3/uL    Absolute Monocytes 0.7 0.0 - 1.3 10e3/uL    Absolute Eosinophils 0.0 0.0 - 0.7 10e3/uL    Absolute Basophils 0.0 0.0 - 0.2 10e3/uL    Absolute Immature Granulocytes 0.1 <=0.4 10e3/uL    Absolute NRBCs 0.0 10e3/uL   UA with Microscopic reflex to Culture    Collection Time: 02/11/24  3:07 AM    Specimen: Urine, Clean Catch   Result Value Ref Range    Color Urine Yellow Colorless, Straw, Light Yellow, Yellow    Appearance Urine Clear Clear    Glucose Urine Negative Negative mg/dL    Bilirubin Urine Negative Negative    Ketones Urine 100 (A) Negative mg/dL    Specific Gravity Urine 1.035 (H) 1.001 - 1.030    Blood Urine Negative Negative    pH Urine 5.5 5.0 - 7.0    Protein Albumin Urine 30 (A) Negative mg/dL    Urobilinogen Urine <2.0 <2.0 mg/dL    Nitrite Urine Negative Negative    Leukocyte Esterase Urine 75 Derek/uL (A) Negative    Mucus Urine Present (A) None Seen /LPF    Calcium Oxalate Crystals Urine Few (A) None Seen /HPF    RBC Urine 1 <=2 /HPF    WBC Urine 6 (H) <=5 /HPF    Squamous Epithelials Urine 4 (H) <=1 /HPF   Comprehensive metabolic panel    Collection Time: 02/11/24  5:44 AM   Result Value Ref Range    Sodium 136 135 - 145 mmol/L    Potassium 5.0 3.4 - 5.3 mmol/L    Carbon Dioxide (CO2) 19 (L) 22 - 29 mmol/L    Anion Gap 9 7 - 15 mmol/L    Urea Nitrogen 13.5 6.0 - 20.0 mg/dL    Creatinine 0.75 0.51 - 0.95 mg/dL    GFR Estimate >90 >60 mL/min/1.73m2    Calcium 8.2 (L) 8.6 - 10.0 mg/dL    Chloride 108 (H) 98 - 107 mmol/L    Glucose 96 70 - 99 mg/dL    Alkaline Phosphatase 39 (L) 40 - 150 U/L    AST 20 0 - 45 U/L    ALT <5 0 - 50 U/L    Protein Total 5.2 (L) 6.4 - 8.3 g/dL    Albumin 3.2 (L) 3.5 - 5.2 g/dL    Bilirubin Total 0.3 <=1.2 mg/dL   Bilirubin direct    Collection  Time: 02/11/24  5:44 AM   Result Value Ref Range    Bilirubin Direct <0.20 0.00 - 0.30 mg/dL   CBC with platelets and differential    Collection Time: 02/11/24  5:44 AM   Result Value Ref Range    WBC Count 14.9 (H) 4.0 - 11.0 10e3/uL    RBC Count 4.41 3.80 - 5.20 10e6/uL    Hemoglobin 13.3 11.7 - 15.7 g/dL    Hematocrit 39.0 35.0 - 47.0 %    MCV 88 78 - 100 fL    MCH 30.2 26.5 - 33.0 pg    MCHC 34.1 31.5 - 36.5 g/dL    RDW 12.2 10.0 - 15.0 %    Platelet Count 238 150 - 450 10e3/uL    % Neutrophils 88 %    % Lymphocytes 9 %    % Monocytes 3 %    % Eosinophils 0 %    % Basophils 0 %    % Immature Granulocytes 0 %    NRBCs per 100 WBC 0 <1 /100    Absolute Neutrophils 12.9 (H) 1.6 - 8.3 10e3/uL    Absolute Lymphocytes 1.4 0.8 - 5.3 10e3/uL    Absolute Monocytes 0.5 0.0 - 1.3 10e3/uL    Absolute Eosinophils 0.0 0.0 - 0.7 10e3/uL    Absolute Basophils 0.0 0.0 - 0.2 10e3/uL    Absolute Immature Granulocytes 0.1 <=0.4 10e3/uL    Absolute NRBCs 0.0 10e3/uL       Results for orders placed or performed during the hospital encounter of 02/11/24   US Pelvic Complete with Transvaginal    Impression    IMPRESSION:  1.  Findings consistent with ovarian hyperstimulation syndrome, with complex fluid throughout the pelvis presumably related to blood products.         CT Abdomen Pelvis w Contrast    Impression    IMPRESSION:   1.  Bilateral ovarian enlargement with numerous enlarged cysts/follicles, some of which contain internal hemorrhagic or proteinaceous debris. There is small volume abdominopelvic ascites which appears simple. Findings compatible with cyst rupture in   setting of ovarian hyperstimulation syndrome.         Attestation:  Amount of time performed on this consult: 20 minutes.    Gloria Wu MD

## 2024-02-11 NOTE — DISCHARGE SUMMARY
[facility] Discharge Summary    Tessa Aly MRN# [id number]   Age: 33 year old YOB: 1990     Date of Admission:  2/11/2024  Date of Discharge::  2/11/2024   Admitting Physician:  2/11/2024  Discharge Physician:  Gloria Wu MD     Home clinic: [facility]          Admission Diagnoses:   Mild OHSS          Discharge Diagnosis:     Same          Procedures:   None           Medications Prior to Admission:     Medications Prior to Admission   Medication Sig Dispense Refill Last Dose    oxyCODONE (ROXICODONE) 5 MG tablet Take 5 mg by mouth every 6 hours as needed for moderate to severe pain       aspirin-acetaminophen-caffeine (EXCEDRIN MIGRAINE) 250-250-65 MG tablet Take 1 tablet by mouth every 6 hours as needed for headaches       eletriptan (RELPAX) 20 MG tablet Take 1 tablet (20 mg) by mouth at onset of headache for migraine (repeat in 2 hours if needed) May repeat in 2 hours. Max 2 tablets/24 hours. 18 tablet 11     galcanezumab-gnlm (EMGALITY) 120 MG/ML injection Inject 1 mL (120 mg) Subcutaneous every 28 days 1 mL 11     rizatriptan (MAXALT) 10 MG tablet Take 1 tablet (10 mg) by mouth at onset of headache for migraine (repeat in 2 hours if needed) 18 tablet 11              Discharge Medications:     Current Discharge Medication List        START taking these medications    Details   !! oxyCODONE (ROXICODONE) 5 MG tablet Take 1 tablet (5 mg) by mouth every 4 hours as needed for moderate pain  Qty: 12 tablet, Refills: 0    Associated Diagnoses: Ovarian hyperstimulation syndrome       !! - Potential duplicate medications found. Please discuss with provider.        CONTINUE these medications which have NOT CHANGED    Details   !! oxyCODONE (ROXICODONE) 5 MG tablet Take 5 mg by mouth every 6 hours as needed for moderate to severe pain      aspirin-acetaminophen-caffeine (EXCEDRIN MIGRAINE) 250-250-65 MG tablet Take 1 tablet by mouth every 6 hours as needed for headaches       eletriptan (RELPAX) 20 MG tablet Take 1 tablet (20 mg) by mouth at onset of headache for migraine (repeat in 2 hours if needed) May repeat in 2 hours. Max 2 tablets/24 hours.  Qty: 18 tablet, Refills: 11    Associated Diagnoses: Chronic migraine without aura without status migrainosus, not intractable      galcanezumab-gnlm (EMGALITY) 120 MG/ML injection Inject 1 mL (120 mg) Subcutaneous every 28 days  Qty: 1 mL, Refills: 11    Associated Diagnoses: Chronic migraine without aura without status migrainosus, not intractable      rizatriptan (MAXALT) 10 MG tablet Take 1 tablet (10 mg) by mouth at onset of headache for migraine (repeat in 2 hours if needed)  Qty: 18 tablet, Refills: 11    Associated Diagnoses: Chronic migraine without aura without status migrainosus, not intractable       !! - Potential duplicate medications found. Please discuss with provider.                Consultations:   No consultations were requested during this admission          Brief History of Illness:   Tessa Aly is a 33 year old who presented to the ER with acute pain starting at 2100. Had egg retrieval on Thursday (3 days ago). Pain medications given in ER and currently patient has much better pain control except when moving. No other symptoms noted. Electrolytes and liver function normal on admission.            Hospital Course:   The patient's hospital course was unremarkable.  She received pain control medications. Case discussed with her primary MARICHUY whose clinic will coordinate follow up.          Discharge Instructions and Follow-Up:     Discharge diet: Regular   Discharge activity: Activity as tolerated - no intercourse, jumping, running, etc.   Discharge follow-up: Call [facility] tomorrow   Wound care NA           Discharge Disposition:     Discharged to home      Attestation:  Amount of time performed on this discharge summary: 15 minutes.    Gloria Wu MD

## 2024-05-07 ENCOUNTER — MYC MEDICAL ADVICE (OUTPATIENT)
Dept: NEUROLOGY | Facility: CLINIC | Age: 34
End: 2024-05-07
Payer: COMMERCIAL

## 2024-05-07 DIAGNOSIS — G43.709 CHRONIC MIGRAINE WITHOUT AURA WITHOUT STATUS MIGRAINOSUS, NOT INTRACTABLE: ICD-10-CM

## 2024-05-08 DIAGNOSIS — G43.709 CHRONIC MIGRAINE WITHOUT AURA WITHOUT STATUS MIGRAINOSUS, NOT INTRACTABLE: Primary | ICD-10-CM

## 2024-05-08 RX ORDER — METOCLOPRAMIDE 10 MG/1
10 TABLET ORAL 3 TIMES DAILY PRN
Qty: 20 TABLET | Refills: 11 | Status: SHIPPED | OUTPATIENT
Start: 2024-05-08

## 2024-05-08 RX ORDER — ELETRIPTAN HYDROBROMIDE 20 MG/1
20 TABLET, FILM COATED ORAL
Qty: 18 TABLET | Refills: 11 | Status: SHIPPED | OUTPATIENT
Start: 2024-05-08

## 2024-06-20 ENCOUNTER — LAB REQUISITION (OUTPATIENT)
Dept: LAB | Facility: CLINIC | Age: 34
End: 2024-06-20

## 2024-06-20 ENCOUNTER — TRANSFERRED RECORDS (OUTPATIENT)
Dept: HEALTH INFORMATION MANAGEMENT | Facility: CLINIC | Age: 34
End: 2024-06-20
Payer: COMMERCIAL

## 2024-06-20 DIAGNOSIS — O09.91 SUPERVISION OF HIGH RISK PREGNANCY, UNSPECIFIED, FIRST TRIMESTER: ICD-10-CM

## 2024-06-20 LAB
HBA1C MFR BLD: 5.4 %
HIV 1+2 AB+HIV1 P24 AG SERPL QL IA: NONREACTIVE

## 2024-06-20 PROCEDURE — 86900 BLOOD TYPING SEROLOGIC ABO: CPT | Performed by: NURSE PRACTITIONER

## 2024-06-20 PROCEDURE — 83036 HEMOGLOBIN GLYCOSYLATED A1C: CPT | Performed by: NURSE PRACTITIONER

## 2024-06-20 PROCEDURE — 87389 HIV-1 AG W/HIV-1&-2 AB AG IA: CPT | Performed by: NURSE PRACTITIONER

## 2024-06-20 PROCEDURE — 87340 HEPATITIS B SURFACE AG IA: CPT | Performed by: NURSE PRACTITIONER

## 2024-06-20 PROCEDURE — 86592 SYPHILIS TEST NON-TREP QUAL: CPT | Performed by: NURSE PRACTITIONER

## 2024-06-21 LAB
ABO/RH(D): NORMAL
ANTIBODY SCREEN: NEGATIVE
HBV SURFACE AG SERPL QL IA: NONREACTIVE
RPR SER QL: NONREACTIVE
SPECIMEN EXPIRATION DATE: NORMAL

## 2024-07-14 ENCOUNTER — HEALTH MAINTENANCE LETTER (OUTPATIENT)
Age: 34
End: 2024-07-14

## 2024-07-16 ENCOUNTER — LAB REQUISITION (OUTPATIENT)
Dept: LAB | Facility: CLINIC | Age: 34
End: 2024-07-16

## 2024-07-16 ENCOUNTER — MEDICAL CORRESPONDENCE (OUTPATIENT)
Dept: HEALTH INFORMATION MANAGEMENT | Facility: CLINIC | Age: 34
End: 2024-07-16
Payer: COMMERCIAL

## 2024-07-16 DIAGNOSIS — Z36.1 ENCOUNTER FOR ANTENATAL SCREENING FOR RAISED ALPHAFETOPROTEIN LEVEL: ICD-10-CM

## 2024-07-16 PROCEDURE — 82105 ALPHA-FETOPROTEIN SERUM: CPT | Performed by: OBSTETRICS & GYNECOLOGY

## 2024-07-16 PROCEDURE — 87491 CHLMYD TRACH DNA AMP PROBE: CPT | Performed by: OBSTETRICS & GYNECOLOGY

## 2024-07-17 ENCOUNTER — TRANSCRIBE ORDERS (OUTPATIENT)
Dept: MATERNAL FETAL MEDICINE | Facility: HOSPITAL | Age: 34
End: 2024-07-17
Payer: COMMERCIAL

## 2024-07-17 DIAGNOSIS — O26.90 PREGNANCY RELATED CONDITION, ANTEPARTUM: Primary | ICD-10-CM

## 2024-07-17 LAB
C TRACH DNA SPEC QL PROBE+SIG AMP: NEGATIVE
N GONORRHOEA DNA SPEC QL NAA+PROBE: NEGATIVE

## 2024-07-18 LAB
# FETUSES US: NORMAL
AFP MOM SERPL: 2.67
AFP SERPL-MCNC: 97 NG/ML
AGE - REPORTED: 36.1 YR
CURRENT SMOKER: NO
FAMILY MEMBER DISEASES HX: NO
GA METHOD: NORMAL
GA: NORMAL WK
IDDM PATIENT QL: NO
INTEGRATED SCN PATIENT-IMP: NORMAL
SPECIMEN DRAWN SERPL: NORMAL

## 2024-08-08 ENCOUNTER — PRE VISIT (OUTPATIENT)
Dept: MATERNAL FETAL MEDICINE | Facility: HOSPITAL | Age: 34
End: 2024-08-08
Payer: COMMERCIAL

## 2024-08-12 ENCOUNTER — ANCILLARY PROCEDURE (OUTPATIENT)
Dept: ULTRASOUND IMAGING | Facility: HOSPITAL | Age: 34
End: 2024-08-12
Attending: OBSTETRICS & GYNECOLOGY
Payer: COMMERCIAL

## 2024-08-12 ENCOUNTER — OFFICE VISIT (OUTPATIENT)
Dept: MATERNAL FETAL MEDICINE | Facility: HOSPITAL | Age: 34
End: 2024-08-12
Attending: OBSTETRICS & GYNECOLOGY
Payer: COMMERCIAL

## 2024-08-12 DIAGNOSIS — O09.522 ADVANCED MATERNAL AGE IN MULTIGRAVIDA, SECOND TRIMESTER: Primary | ICD-10-CM

## 2024-08-12 DIAGNOSIS — O26.90 PREGNANCY RELATED CONDITION, ANTEPARTUM: ICD-10-CM

## 2024-08-12 DIAGNOSIS — O30.042 DICHORIONIC DIAMNIOTIC TWIN PREGNANCY IN SECOND TRIMESTER: ICD-10-CM

## 2024-08-12 PROCEDURE — 99203 OFFICE O/P NEW LOW 30 MIN: CPT | Mod: 25 | Performed by: OBSTETRICS & GYNECOLOGY

## 2024-08-12 PROCEDURE — 76812 OB US DETAILED ADDL FETUS: CPT | Mod: 26 | Performed by: OBSTETRICS & GYNECOLOGY

## 2024-08-12 PROCEDURE — 76811 OB US DETAILED SNGL FETUS: CPT

## 2024-08-12 PROCEDURE — 76811 OB US DETAILED SNGL FETUS: CPT | Mod: 26 | Performed by: OBSTETRICS & GYNECOLOGY

## 2024-08-12 NOTE — NURSING NOTE
Tessa is here with her significant other today for a comprehensive anatomy ultrasound. Denies questions or concerns today. Reports IVF transfer of two frozen embryos at the 2 days. Denies preimplantation genetic testing. Education performed on today's ultrasound. SBAR given to SHANEKA JESUS, see their note in Epic.

## 2024-08-12 NOTE — PROGRESS NOTES
Please see the imaging tab for details of the ultrasound performed today.    Shanta Gaytan MD  Specialist in Maternal-Fetal Medicine

## 2024-09-06 ENCOUNTER — OFFICE VISIT (OUTPATIENT)
Dept: CARDIOLOGY | Facility: CLINIC | Age: 34
End: 2024-09-06

## 2024-09-06 ENCOUNTER — HOSPITAL ENCOUNTER (OUTPATIENT)
Dept: CARDIOLOGY | Facility: CLINIC | Age: 34
Discharge: HOME | End: 2024-09-06
Attending: OBSTETRICS & GYNECOLOGY
Payer: COMMERCIAL

## 2024-09-06 DIAGNOSIS — O26.90 PREGNANCY RELATED CONDITION, ANTEPARTUM: ICD-10-CM

## 2024-09-06 DIAGNOSIS — O35.BXX0 FETAL CARDIAC DISEASE AFFECTING PREGNANCY, SINGLE OR UNSPECIFIED FETUS: Primary | ICD-10-CM

## 2024-09-06 PROCEDURE — 76827 ECHO EXAM OF FETAL HEART: CPT | Mod: 26 | Performed by: PEDIATRICS

## 2024-09-06 PROCEDURE — 76827 ECHO EXAM OF FETAL HEART: CPT

## 2024-09-06 PROCEDURE — 93325 DOPPLER ECHO COLOR FLOW MAPG: CPT | Mod: 26 | Performed by: PEDIATRICS

## 2024-09-06 PROCEDURE — 76825 ECHO EXAM OF FETAL HEART: CPT | Mod: 26 | Performed by: PEDIATRICS

## 2024-09-06 PROCEDURE — 99202 OFFICE O/P NEW SF 15 MIN: CPT | Mod: 25 | Performed by: PEDIATRICS

## 2024-09-06 PROCEDURE — 93325 DOPPLER ECHO COLOR FLOW MAPG: CPT

## 2024-09-06 NOTE — PROGRESS NOTES
[facility]   Heart Center Fetal Consult Note    Patient:  Tessa Aly MRN:  [id number]   YOB: 1990 Age:  33 year old   Date of Visit:  2024 PCP:  Anya Sutherland MD     Dear Doctor,     I had the pleasure of seeing Tessa Aly at the [facility] on 2024 in fetal cardiology consultation for fetal echocardiogram results. She presented today accompanied by her partner. As you know, she is a 33 year old  at 23w3d who presented for fetal echocardiogram today because of diamniotic, dichorionic twin gestation, IVF pregnancy, and incomplete cardiac exam.    I performed and interpreted the fetal echocardiogram today, which demonstrated:   Fetus 1: normal fetal cardiac anatomy. Normal fetal intracardiac connections. Normal right and left ventricular size and function. No pericardial effusion.     Fetus 2. Normal fetal cardiac anatomy. Normal fetal intracardiac connections. Normal right and left ventricular size and function. No pericardial effusion.     I reviewed the echo findings today with Tessa Aly and her partner. She is aware that the study was within normal limits with no major cardiac abnormalities. She is aware of the general limitations of fetal echocardiography. No additional fetal echocardiograms are recommended. No  cardiac follow-up is required.     Thank you for allowing me to participate in Tessa's care. Please do not hesitate to contact me with questions or concerns.    This visit was separate from the performance and interpretation of the ultrasound. The majority of the time (>50%) was spent in counseling and coordination of care. I spent approximately 20 minutes in face-to-face time reviewing the above considerations.    Leann Elam M.D.  Pediatric Cardiology  [address], 5th floor, [facility] [address]  Phone [phone]  Fax  [phone]

## 2024-10-09 ENCOUNTER — LAB REQUISITION (OUTPATIENT)
Dept: LAB | Facility: CLINIC | Age: 34
End: 2024-10-09

## 2024-10-09 DIAGNOSIS — Z36.89 ENCOUNTER FOR OTHER SPECIFIED ANTENATAL SCREENING: ICD-10-CM

## 2024-10-09 DIAGNOSIS — Z11.3 ENCOUNTER FOR SCREENING FOR INFECTIONS WITH A PREDOMINANTLY SEXUAL MODE OF TRANSMISSION: ICD-10-CM

## 2024-10-09 LAB
ERYTHROCYTE [DISTWIDTH] IN BLOOD BY AUTOMATED COUNT: 12.5 % (ref 10–15)
HCT VFR BLD AUTO: 39.6 % (ref 35–47)
HGB BLD-MCNC: 12.5 G/DL (ref 11.7–15.7)
MCH RBC QN AUTO: 29.5 PG (ref 26.5–33)
MCHC RBC AUTO-ENTMCNC: 31.6 G/DL (ref 31.5–36.5)
MCV RBC AUTO: 93 FL (ref 78–100)
PLATELET # BLD AUTO: 174 10E3/UL (ref 150–450)
RBC # BLD AUTO: 4.24 10E6/UL (ref 3.8–5.2)
WBC # BLD AUTO: 12.8 10E3/UL (ref 4–11)

## 2024-10-09 PROCEDURE — 86780 TREPONEMA PALLIDUM: CPT | Performed by: NURSE PRACTITIONER

## 2024-10-09 PROCEDURE — 85027 COMPLETE CBC AUTOMATED: CPT | Performed by: NURSE PRACTITIONER

## 2024-10-10 LAB — T PALLIDUM AB SER QL: NONREACTIVE

## 2024-10-22 ENCOUNTER — LAB REQUISITION (OUTPATIENT)
Dept: LAB | Facility: CLINIC | Age: 34
End: 2024-10-22
Payer: COMMERCIAL

## 2024-10-22 DIAGNOSIS — Z36.85 ENCOUNTER FOR ANTENATAL SCREENING FOR STREPTOCOCCUS B: ICD-10-CM

## 2024-10-22 PROCEDURE — 87653 STREP B DNA AMP PROBE: CPT | Mod: ORL | Performed by: OBSTETRICS & GYNECOLOGY

## 2024-10-23 LAB — GP B STREP DNA SPEC QL NAA+PROBE: NEGATIVE

## 2024-11-05 ENCOUNTER — LAB REQUISITION (OUTPATIENT)
Dept: LAB | Facility: CLINIC | Age: 34
End: 2024-11-05

## 2024-11-05 DIAGNOSIS — H53.9 UNSPECIFIED VISUAL DISTURBANCE: ICD-10-CM

## 2024-11-05 LAB
ALBUMIN MFR UR ELPH: 48.5 MG/DL
CREAT UR-MCNC: 246 MG/DL
ERYTHROCYTE [DISTWIDTH] IN BLOOD BY AUTOMATED COUNT: 12.7 % (ref 10–15)
HCT VFR BLD AUTO: 40.4 % (ref 35–47)
HGB BLD-MCNC: 12.9 G/DL (ref 11.7–15.7)
MCH RBC QN AUTO: 28.9 PG (ref 26.5–33)
MCHC RBC AUTO-ENTMCNC: 31.9 G/DL (ref 31.5–36.5)
MCV RBC AUTO: 90 FL (ref 78–100)
PLATELET # BLD AUTO: 132 10E3/UL (ref 150–450)
PROT/CREAT 24H UR: 0.2 MG/MG CR (ref 0–0.2)
RBC # BLD AUTO: 4.47 10E6/UL (ref 3.8–5.2)
WBC # BLD AUTO: 10.6 10E3/UL (ref 4–11)

## 2024-11-05 PROCEDURE — 84450 TRANSFERASE (AST) (SGOT): CPT | Performed by: OBSTETRICS & GYNECOLOGY

## 2024-11-05 PROCEDURE — 84460 ALANINE AMINO (ALT) (SGPT): CPT | Performed by: OBSTETRICS & GYNECOLOGY

## 2024-11-05 PROCEDURE — 84156 ASSAY OF PROTEIN URINE: CPT | Performed by: OBSTETRICS & GYNECOLOGY

## 2024-11-05 PROCEDURE — 85014 HEMATOCRIT: CPT | Performed by: OBSTETRICS & GYNECOLOGY

## 2024-11-06 LAB
ALT SERPL W P-5'-P-CCNC: 16 U/L (ref 0–50)
AST SERPL W P-5'-P-CCNC: 25 U/L (ref 0–45)

## 2024-12-03 ENCOUNTER — LAB REQUISITION (OUTPATIENT)
Dept: LAB | Facility: CLINIC | Age: 34
End: 2024-12-03

## 2024-12-03 ENCOUNTER — LAB REQUISITION (OUTPATIENT)
Dept: LAB | Facility: CLINIC | Age: 34
End: 2024-12-03
Payer: COMMERCIAL

## 2024-12-03 DIAGNOSIS — O99.113 OTHER DISEASES OF THE BLOOD AND BLOOD-FORMING ORGANS AND CERTAIN DISORDERS INVOLVING THE IMMUNE MECHANISM COMPLICATING PREGNANCY, THIRD TRIMESTER: ICD-10-CM

## 2024-12-03 DIAGNOSIS — Z36.85 ENCOUNTER FOR ANTENATAL SCREENING FOR STREPTOCOCCUS B: ICD-10-CM

## 2024-12-03 LAB
ERYTHROCYTE [DISTWIDTH] IN BLOOD BY AUTOMATED COUNT: 13.6 % (ref 10–15)
HCT VFR BLD AUTO: 40.5 % (ref 35–47)
HGB BLD-MCNC: 12.9 G/DL (ref 11.7–15.7)
MCH RBC QN AUTO: 28.2 PG (ref 26.5–33)
MCHC RBC AUTO-ENTMCNC: 31.9 G/DL (ref 31.5–36.5)
MCV RBC AUTO: 88 FL (ref 78–100)
PLAT MORPH BLD: NORMAL
PLATELET # BLD AUTO: 133 10E3/UL (ref 150–450)
RBC # BLD AUTO: 4.58 10E6/UL (ref 3.8–5.2)
RBC MORPH BLD: NORMAL
WBC # BLD AUTO: 9.7 10E3/UL (ref 4–11)

## 2024-12-03 PROCEDURE — 85041 AUTOMATED RBC COUNT: CPT | Performed by: OBSTETRICS & GYNECOLOGY

## 2024-12-03 PROCEDURE — 85014 HEMATOCRIT: CPT | Performed by: OBSTETRICS & GYNECOLOGY

## 2024-12-04 LAB — GP B STREP DNA SPEC QL NAA+PROBE: NEGATIVE

## 2024-12-12 ENCOUNTER — HOSPITAL ENCOUNTER (INPATIENT)
Facility: HOSPITAL | Age: 34
End: 2024-12-12
Attending: OBSTETRICS & GYNECOLOGY | Admitting: OBSTETRICS & GYNECOLOGY
Payer: COMMERCIAL

## 2024-12-12 VITALS — TEMPERATURE: 98 F | DIASTOLIC BLOOD PRESSURE: 65 MMHG | SYSTOLIC BLOOD PRESSURE: 107 MMHG | HEART RATE: 75 BPM

## 2024-12-12 PROBLEM — Z36.89 ENCOUNTER FOR TRIAGE IN PREGNANT PATIENT: Status: ACTIVE | Noted: 2024-12-12

## 2024-12-12 PROBLEM — O14.00 MILD PRE-ECLAMPSIA: Status: ACTIVE | Noted: 2024-12-12

## 2024-12-12 LAB
ABO + RH BLD: NORMAL
ALBUMIN MFR UR ELPH: 74.4 MG/DL
ALBUMIN SERPL BCG-MCNC: 3.2 G/DL (ref 3.5–5.2)
ALP SERPL-CCNC: 217 U/L (ref 40–150)
ALT SERPL W P-5'-P-CCNC: 11 U/L (ref 0–50)
ANION GAP SERPL CALCULATED.3IONS-SCNC: 12 MMOL/L (ref 7–15)
AST SERPL W P-5'-P-CCNC: 24 U/L (ref 0–45)
BILIRUB SERPL-MCNC: 0.3 MG/DL
BLD GP AB SCN SERPL QL: NEGATIVE
BUN SERPL-MCNC: 11 MG/DL (ref 6–20)
CALCIUM SERPL-MCNC: 8.6 MG/DL (ref 8.8–10.4)
CHLORIDE SERPL-SCNC: 105 MMOL/L (ref 98–107)
CREAT SERPL-MCNC: 0.81 MG/DL (ref 0.51–0.95)
CREAT UR-MCNC: 156.8 MG/DL
EGFRCR SERPLBLD CKD-EPI 2021: >90 ML/MIN/1.73M2
ERYTHROCYTE [DISTWIDTH] IN BLOOD BY AUTOMATED COUNT: 13.6 % (ref 10–15)
GLUCOSE SERPL-MCNC: 64 MG/DL (ref 70–99)
HCO3 SERPL-SCNC: 20 MMOL/L (ref 22–29)
HCT VFR BLD AUTO: 39 % (ref 35–47)
HGB BLD-MCNC: 12.9 G/DL (ref 11.7–15.7)
HOLD SPECIMEN: NORMAL
MCH RBC QN AUTO: 28.7 PG (ref 26.5–33)
MCHC RBC AUTO-ENTMCNC: 33.1 G/DL (ref 31.5–36.5)
MCV RBC AUTO: 87 FL (ref 78–100)
PLATELET # BLD AUTO: 136 10E3/UL (ref 150–450)
POTASSIUM SERPL-SCNC: 4.6 MMOL/L (ref 3.4–5.3)
PROT SERPL-MCNC: 6.3 G/DL (ref 6.4–8.3)
PROT/CREAT 24H UR: 0.47 MG/MG CR (ref 0–0.2)
RBC # BLD AUTO: 4.5 10E6/UL (ref 3.8–5.2)
SODIUM SERPL-SCNC: 137 MMOL/L (ref 135–145)
SPECIMEN EXP DATE BLD: NORMAL
WBC # BLD AUTO: 8.7 10E3/UL (ref 4–11)

## 2024-12-12 PROCEDURE — 86900 BLOOD TYPING SEROLOGIC ABO: CPT | Performed by: OBSTETRICS & GYNECOLOGY

## 2024-12-12 PROCEDURE — 84156 ASSAY OF PROTEIN URINE: CPT | Performed by: OBSTETRICS & GYNECOLOGY

## 2024-12-12 PROCEDURE — 250N000013 HC RX MED GY IP 250 OP 250 PS 637: Performed by: OBSTETRICS & GYNECOLOGY

## 2024-12-12 PROCEDURE — 36415 COLL VENOUS BLD VENIPUNCTURE: CPT | Performed by: OBSTETRICS & GYNECOLOGY

## 2024-12-12 PROCEDURE — 85014 HEMATOCRIT: CPT | Performed by: OBSTETRICS & GYNECOLOGY

## 2024-12-12 PROCEDURE — 86780 TREPONEMA PALLIDUM: CPT | Performed by: OBSTETRICS & GYNECOLOGY

## 2024-12-12 PROCEDURE — 82310 ASSAY OF CALCIUM: CPT | Performed by: OBSTETRICS & GYNECOLOGY

## 2024-12-12 PROCEDURE — 120N000001 HC R&B MED SURG/OB

## 2024-12-12 PROCEDURE — 3E033VJ INTRODUCTION OF OTHER HORMONE INTO PERIPHERAL VEIN, PERCUTANEOUS APPROACH: ICD-10-PCS | Performed by: OBSTETRICS & GYNECOLOGY

## 2024-12-12 PROCEDURE — 82435 ASSAY OF BLOOD CHLORIDE: CPT | Performed by: OBSTETRICS & GYNECOLOGY

## 2024-12-12 PROCEDURE — 85041 AUTOMATED RBC COUNT: CPT | Performed by: OBSTETRICS & GYNECOLOGY

## 2024-12-12 RX ORDER — MORPHINE SULFATE 10 MG/ML
10 INJECTION, SOLUTION INTRAMUSCULAR; INTRAVENOUS EVERY 4 HOURS PRN
Status: DISCONTINUED | OUTPATIENT
Start: 2024-12-12 | End: 2024-12-15 | Stop reason: HOSPADM

## 2024-12-12 RX ORDER — PROCHLORPERAZINE MALEATE 10 MG
10 TABLET ORAL EVERY 6 HOURS PRN
Status: DISCONTINUED | OUTPATIENT
Start: 2024-12-12 | End: 2024-12-13 | Stop reason: HOSPADM

## 2024-12-12 RX ORDER — ONDANSETRON 4 MG/1
4 TABLET, ORALLY DISINTEGRATING ORAL EVERY 6 HOURS PRN
Status: DISCONTINUED | OUTPATIENT
Start: 2024-12-12 | End: 2024-12-13 | Stop reason: HOSPADM

## 2024-12-12 RX ORDER — LABETALOL HYDROCHLORIDE 5 MG/ML
20 INJECTION, SOLUTION INTRAVENOUS
Status: DISCONTINUED | OUTPATIENT
Start: 2024-12-12 | End: 2024-12-15 | Stop reason: HOSPADM

## 2024-12-12 RX ORDER — FENTANYL CITRATE 50 UG/ML
100 INJECTION, SOLUTION INTRAMUSCULAR; INTRAVENOUS
Status: DISCONTINUED | OUTPATIENT
Start: 2024-12-12 | End: 2024-12-13 | Stop reason: HOSPADM

## 2024-12-12 RX ORDER — ASPIRIN 81 MG/1
81 TABLET ORAL DAILY
COMMUNITY

## 2024-12-12 RX ORDER — METOCLOPRAMIDE 10 MG/1
10 TABLET ORAL EVERY 6 HOURS PRN
Status: DISCONTINUED | OUTPATIENT
Start: 2024-12-12 | End: 2024-12-13 | Stop reason: HOSPADM

## 2024-12-12 RX ORDER — NALOXONE HYDROCHLORIDE 0.4 MG/ML
0.4 INJECTION, SOLUTION INTRAMUSCULAR; INTRAVENOUS; SUBCUTANEOUS
Status: DISCONTINUED | OUTPATIENT
Start: 2024-12-12 | End: 2024-12-13 | Stop reason: HOSPADM

## 2024-12-12 RX ORDER — OXYTOCIN 10 [USP'U]/ML
10 INJECTION, SOLUTION INTRAMUSCULAR; INTRAVENOUS
Status: DISCONTINUED | OUTPATIENT
Start: 2024-12-12 | End: 2024-12-13 | Stop reason: HOSPADM

## 2024-12-12 RX ORDER — ACETAMINOPHEN 650 MG/1
650 SUPPOSITORY RECTAL EVERY 4 HOURS PRN
Status: DISCONTINUED | OUTPATIENT
Start: 2024-12-12 | End: 2024-12-15 | Stop reason: HOSPADM

## 2024-12-12 RX ORDER — METHYLERGONOVINE MALEATE 0.2 MG/ML
200 INJECTION INTRAVENOUS
Status: DISCONTINUED | OUTPATIENT
Start: 2024-12-12 | End: 2024-12-13 | Stop reason: HOSPADM

## 2024-12-12 RX ORDER — LOPERAMIDE HYDROCHLORIDE 2 MG/1
4 CAPSULE ORAL
Status: DISCONTINUED | OUTPATIENT
Start: 2024-12-12 | End: 2024-12-13 | Stop reason: HOSPADM

## 2024-12-12 RX ORDER — METOCLOPRAMIDE HYDROCHLORIDE 5 MG/ML
10 INJECTION INTRAMUSCULAR; INTRAVENOUS EVERY 6 HOURS PRN
Status: DISCONTINUED | OUTPATIENT
Start: 2024-12-12 | End: 2024-12-13 | Stop reason: HOSPADM

## 2024-12-12 RX ORDER — KETOROLAC TROMETHAMINE 30 MG/ML
30 INJECTION, SOLUTION INTRAMUSCULAR; INTRAVENOUS
Status: DISCONTINUED | OUTPATIENT
Start: 2024-12-12 | End: 2024-12-13

## 2024-12-12 RX ORDER — LIDOCAINE 40 MG/G
CREAM TOPICAL
Status: DISCONTINUED | OUTPATIENT
Start: 2024-12-12 | End: 2024-12-12 | Stop reason: HOSPADM

## 2024-12-12 RX ORDER — ONDANSETRON 2 MG/ML
4 INJECTION INTRAMUSCULAR; INTRAVENOUS EVERY 6 HOURS PRN
Status: DISCONTINUED | OUTPATIENT
Start: 2024-12-12 | End: 2024-12-13 | Stop reason: HOSPADM

## 2024-12-12 RX ORDER — OXYTOCIN/0.9 % SODIUM CHLORIDE 30/500 ML
340 PLASTIC BAG, INJECTION (ML) INTRAVENOUS CONTINUOUS PRN
Status: DISCONTINUED | OUTPATIENT
Start: 2024-12-12 | End: 2024-12-13 | Stop reason: HOSPADM

## 2024-12-12 RX ORDER — CITRIC ACID/SODIUM CITRATE 334-500MG
30 SOLUTION, ORAL ORAL
Status: DISCONTINUED | OUTPATIENT
Start: 2024-12-12 | End: 2024-12-13 | Stop reason: HOSPADM

## 2024-12-12 RX ORDER — LOPERAMIDE HYDROCHLORIDE 2 MG/1
2 CAPSULE ORAL
Status: DISCONTINUED | OUTPATIENT
Start: 2024-12-12 | End: 2024-12-13 | Stop reason: HOSPADM

## 2024-12-12 RX ORDER — OXYTOCIN/0.9 % SODIUM CHLORIDE 30/500 ML
100-340 PLASTIC BAG, INJECTION (ML) INTRAVENOUS CONTINUOUS PRN
Status: DISCONTINUED | OUTPATIENT
Start: 2024-12-12 | End: 2024-12-15 | Stop reason: HOSPADM

## 2024-12-12 RX ORDER — MISOPROSTOL 200 UG/1
400 TABLET ORAL
Status: DISCONTINUED | OUTPATIENT
Start: 2024-12-12 | End: 2024-12-13 | Stop reason: HOSPADM

## 2024-12-12 RX ORDER — ACETAMINOPHEN 325 MG/1
650 TABLET ORAL EVERY 4 HOURS PRN
Status: DISCONTINUED | OUTPATIENT
Start: 2024-12-12 | End: 2024-12-15 | Stop reason: HOSPADM

## 2024-12-12 RX ORDER — NALOXONE HYDROCHLORIDE 0.4 MG/ML
0.2 INJECTION, SOLUTION INTRAMUSCULAR; INTRAVENOUS; SUBCUTANEOUS
Status: DISCONTINUED | OUTPATIENT
Start: 2024-12-12 | End: 2024-12-13 | Stop reason: HOSPADM

## 2024-12-12 RX ORDER — LIDOCAINE 40 MG/G
CREAM TOPICAL
Status: DISCONTINUED | OUTPATIENT
Start: 2024-12-12 | End: 2024-12-13 | Stop reason: HOSPADM

## 2024-12-12 RX ORDER — MISOPROSTOL 200 UG/1
800 TABLET ORAL
Status: DISCONTINUED | OUTPATIENT
Start: 2024-12-12 | End: 2024-12-13 | Stop reason: HOSPADM

## 2024-12-12 RX ORDER — TRANEXAMIC ACID 10 MG/ML
1 INJECTION, SOLUTION INTRAVENOUS EVERY 30 MIN PRN
Status: DISCONTINUED | OUTPATIENT
Start: 2024-12-12 | End: 2024-12-13 | Stop reason: HOSPADM

## 2024-12-12 RX ORDER — IBUPROFEN 800 MG/1
800 TABLET, FILM COATED ORAL
Status: DISCONTINUED | OUTPATIENT
Start: 2024-12-12 | End: 2024-12-13

## 2024-12-12 RX ORDER — OXYTOCIN 10 [USP'U]/ML
10 INJECTION, SOLUTION INTRAMUSCULAR; INTRAVENOUS
Status: DISCONTINUED | OUTPATIENT
Start: 2024-12-12 | End: 2024-12-15 | Stop reason: HOSPADM

## 2024-12-12 RX ORDER — CALCIUM CARBONATE 500 MG/1
500-1000 TABLET, CHEWABLE ORAL 4 TIMES DAILY PRN
Status: DISCONTINUED | OUTPATIENT
Start: 2024-12-12 | End: 2024-12-15 | Stop reason: HOSPADM

## 2024-12-12 RX ORDER — CARBOPROST TROMETHAMINE 250 UG/ML
250 INJECTION, SOLUTION INTRAMUSCULAR
Status: DISCONTINUED | OUTPATIENT
Start: 2024-12-12 | End: 2024-12-13 | Stop reason: HOSPADM

## 2024-12-12 RX ORDER — HYDROXYZINE HYDROCHLORIDE 50 MG/1
100 TABLET, FILM COATED ORAL
Status: DISCONTINUED | OUTPATIENT
Start: 2024-12-12 | End: 2024-12-15 | Stop reason: HOSPADM

## 2024-12-12 RX ORDER — NIFEDIPINE 10 MG/1
10-20 CAPSULE ORAL
Status: DISCONTINUED | OUTPATIENT
Start: 2024-12-12 | End: 2024-12-15 | Stop reason: HOSPADM

## 2024-12-12 RX ORDER — SODIUM CHLORIDE, SODIUM LACTATE, POTASSIUM CHLORIDE, CALCIUM CHLORIDE 600; 310; 30; 20 MG/100ML; MG/100ML; MG/100ML; MG/100ML
INJECTION, SOLUTION INTRAVENOUS CONTINUOUS PRN
Status: DISCONTINUED | OUTPATIENT
Start: 2024-12-12 | End: 2024-12-13 | Stop reason: HOSPADM

## 2024-12-12 RX ORDER — OXYTOCIN/0.9 % SODIUM CHLORIDE 30/500 ML
1-24 PLASTIC BAG, INJECTION (ML) INTRAVENOUS CONTINUOUS
Status: DISCONTINUED | OUTPATIENT
Start: 2024-12-13 | End: 2024-12-13 | Stop reason: HOSPADM

## 2024-12-12 RX ADMIN — ACETAMINOPHEN 650 MG: 325 TABLET ORAL at 21:56

## 2024-12-12 ASSESSMENT — ACTIVITIES OF DAILY LIVING (ADL)
ADLS_ACUITY_SCORE: 41
ADLS_ACUITY_SCORE: 15
ADLS_ACUITY_SCORE: 41
ADLS_ACUITY_SCORE: 20
ADLS_ACUITY_SCORE: 41

## 2024-12-12 NOTE — H&P
"[facility]    History and Physical       Date of Admission:  2024    History of Present Illness   Dodie Aly is a 34 year old female  37w 2d  Estimated Date of Delivery: Dec 31, 2024 is calculated from No LMP recorded. Patient is pregnant. [facility] with elevated blood pressure in clinic and a headache. She denies changes in vision or RUQ pain. + FM x 2.     OB COMPLICATIONS:  Di/Di twins - Cephalic/Cephalic. Twin A bigger. 21% discordant on   Gestational thrombocytopenia - 133 (12/3)  Polyhydramnios - resolved.   AMA  IVF  Migraines    Past Medical History    No past medical history on file.    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     Prior to Admission Medications   Prior to Admission Medications   Prescriptions Last Dose Informant Patient Reported? Taking?   Prenatal Vit-Fe Fumarate-FA (PRENATAL MULTIVITAMIN  PLUS IRON) 27-1 MG TABS   Yes No   Sig: Take by mouth daily   aspirin-acetaminophen-caffeine (EXCEDRIN MIGRAINE) 250-250-65 MG tablet   Yes No   Sig: Take 1 tablet by mouth every 6 hours as needed for headaches   cholecalciferol (VITAMIN D3) 10 mcg (400 units) TABS tablet   Yes No   Sig: Take 10 mcg by mouth daily   co-enzyme Q-10 100 MG CAPS " capsule   Yes No   Sig: Take 100 mg by mouth daily   eletriptan (RELPAX) 20 MG tablet   No No   Sig: Take 1 tablet (20 mg) by mouth at onset of headache for migraine (repeat in 2 hours if needed) May repeat in 2 hours. Max 2 tablets/24 hours.   galcanezumab-gnlm (EMGALITY) 120 MG/ML injection   No No   Sig: Inject 1 mL (120 mg) Subcutaneous every 28 days   metoclopramide (REGLAN) 10 MG tablet   No No   Sig: Take 1 tablet (10 mg) by mouth 3 times daily as needed (migraine)   ondansetron (ZOFRAN ODT) 4 MG ODT tab   No No   Sig: Take 1 tablet (4 mg) by mouth every 8 hours as needed for nausea   oxyCODONE (ROXICODONE) 5 MG tablet   Yes No   Sig: Take 5 mg by mouth every 6 hours as needed for moderate to severe pain   oxyCODONE (ROXICODONE) 5 MG tablet   No No   Sig: Take 1 tablet (5 mg) by mouth every 4 hours as needed for moderate pain   rizatriptan (MAXALT) 10 MG tablet   No No   Sig: Take 1 tablet (10 mg) by mouth at onset of headache for migraine (repeat in 2 hours if needed)      Facility-Administered Medications: None     Allergies   No Known Allergies    Physical Exam   Vital Signs with Ranges  BP: (134)/(92) 134/92    Gen: no acute distress, resting comfortably   CV: acyanotic   Heart: regular rate and rhythm   Pulm: unlabored respirations, clear to ausculation bilaterally    Abd: gravid, soft, nontender   Extremities: soft, nontender     Recent Labs   Lab Test 06/20/24  1208   AS Negative     Recent Labs   Lab Test 06/20/24  1208   HEPBANG Nonreactive   HIAGAB Nonreactive   RUQIGG Positive       Assessment & Plan   Tsesa Aly is a 34 year old female who presents for elevated blood pressure in clinic and headache.   IUP @ 37w 2d .  -- Admit to L+D for BP evaluation. Will send labs and monitor Bps.   -- GBS negative    Peyton Brady MD

## 2024-12-13 ENCOUNTER — ANESTHESIA (OUTPATIENT)
Dept: OBGYN | Facility: HOSPITAL | Age: 34
End: 2024-12-13
Payer: COMMERCIAL

## 2024-12-13 ENCOUNTER — ANESTHESIA EVENT (OUTPATIENT)
Dept: OBGYN | Facility: HOSPITAL | Age: 34
End: 2024-12-13
Payer: COMMERCIAL

## 2024-12-13 LAB
ALT SERPL W P-5'-P-CCNC: 9 U/L (ref 0–50)
APTT PPP: 30 SECONDS (ref 22–38)
AST SERPL W P-5'-P-CCNC: 21 U/L (ref 0–45)
CREAT SERPL-MCNC: 0.8 MG/DL (ref 0.51–0.95)
EGFRCR SERPLBLD CKD-EPI 2021: >90 ML/MIN/1.73M2
ERYTHROCYTE [DISTWIDTH] IN BLOOD BY AUTOMATED COUNT: 13.6 % (ref 10–15)
HCT VFR BLD AUTO: 38.4 % (ref 35–47)
HGB BLD-MCNC: 11.9 G/DL (ref 11.7–15.7)
HGB BLD-MCNC: 12.3 G/DL (ref 11.7–15.7)
INR PPP: 0.93 (ref 0.85–1.15)
MCH RBC QN AUTO: 27.9 PG (ref 26.5–33)
MCHC RBC AUTO-ENTMCNC: 32 G/DL (ref 31.5–36.5)
MCV RBC AUTO: 87 FL (ref 78–100)
PLATELET # BLD AUTO: 127 10E3/UL (ref 150–450)
RBC # BLD AUTO: 4.41 10E6/UL (ref 3.8–5.2)
T PALLIDUM AB SER QL: NONREACTIVE
WBC # BLD AUTO: 7.9 10E3/UL (ref 4–11)

## 2024-12-13 PROCEDURE — 999N000016 HC STATISTIC ATTENDANCE AT DELIVERY

## 2024-12-13 PROCEDURE — 250N000013 HC RX MED GY IP 250 OP 250 PS 637: Performed by: OBSTETRICS & GYNECOLOGY

## 2024-12-13 PROCEDURE — 250N000009 HC RX 250: Performed by: OBSTETRICS & GYNECOLOGY

## 2024-12-13 PROCEDURE — 88307 TISSUE EXAM BY PATHOLOGIST: CPT | Mod: 26 | Performed by: PATHOLOGY

## 2024-12-13 PROCEDURE — 250N000011 HC RX IP 250 OP 636: Performed by: ANESTHESIOLOGY

## 2024-12-13 PROCEDURE — 88307 TISSUE EXAM BY PATHOLOGIST: CPT | Mod: TC | Performed by: OBSTETRICS & GYNECOLOGY

## 2024-12-13 PROCEDURE — 120N000001 HC R&B MED SURG/OB

## 2024-12-13 PROCEDURE — 84460 ALANINE AMINO (ALT) (SGPT): CPT | Performed by: OBSTETRICS & GYNECOLOGY

## 2024-12-13 PROCEDURE — 85730 THROMBOPLASTIN TIME PARTIAL: CPT | Performed by: OBSTETRICS & GYNECOLOGY

## 2024-12-13 PROCEDURE — 0W3R7ZZ CONTROL BLEEDING IN GENITOURINARY TRACT, VIA NATURAL OR ARTIFICIAL OPENING: ICD-10-PCS | Performed by: OBSTETRICS & GYNECOLOGY

## 2024-12-13 PROCEDURE — 36415 COLL VENOUS BLD VENIPUNCTURE: CPT | Performed by: OBSTETRICS & GYNECOLOGY

## 2024-12-13 PROCEDURE — 250N000011 HC RX IP 250 OP 636: Performed by: OBSTETRICS & GYNECOLOGY

## 2024-12-13 PROCEDURE — 258N000003 HC RX IP 258 OP 636: Performed by: OBSTETRICS & GYNECOLOGY

## 2024-12-13 PROCEDURE — 10907ZC DRAINAGE OF AMNIOTIC FLUID, THERAPEUTIC FROM PRODUCTS OF CONCEPTION, VIA NATURAL OR ARTIFICIAL OPENING: ICD-10-PCS | Performed by: OBSTETRICS & GYNECOLOGY

## 2024-12-13 PROCEDURE — 85027 COMPLETE CBC AUTOMATED: CPT | Performed by: OBSTETRICS & GYNECOLOGY

## 2024-12-13 PROCEDURE — 0KQM0ZZ REPAIR PERINEUM MUSCLE, OPEN APPROACH: ICD-10-PCS | Performed by: OBSTETRICS & GYNECOLOGY

## 2024-12-13 PROCEDURE — 272N000001 HC OR GENERAL SUPPLY STERILE: Performed by: OBSTETRICS & GYNECOLOGY

## 2024-12-13 PROCEDURE — 0UQMXZZ REPAIR VULVA, EXTERNAL APPROACH: ICD-10-PCS | Performed by: OBSTETRICS & GYNECOLOGY

## 2024-12-13 PROCEDURE — 85610 PROTHROMBIN TIME: CPT | Performed by: OBSTETRICS & GYNECOLOGY

## 2024-12-13 PROCEDURE — 82565 ASSAY OF CREATININE: CPT | Performed by: OBSTETRICS & GYNECOLOGY

## 2024-12-13 PROCEDURE — 3E0H7GC INTRODUCTION OF OTHER THERAPEUTIC SUBSTANCE INTO LOWER GI, VIA NATURAL OR ARTIFICIAL OPENING: ICD-10-PCS | Performed by: OBSTETRICS & GYNECOLOGY

## 2024-12-13 PROCEDURE — 999N000157 HC STATISTIC RCP TIME EA 10 MIN

## 2024-12-13 PROCEDURE — 85018 HEMOGLOBIN: CPT | Performed by: OBSTETRICS & GYNECOLOGY

## 2024-12-13 PROCEDURE — 370N000017 HC ANESTHESIA TECHNICAL FEE, PER MIN: Performed by: OBSTETRICS & GYNECOLOGY

## 2024-12-13 PROCEDURE — 722N000002 HC LABOR CARE VAGINAL DELIVERY MULT

## 2024-12-13 PROCEDURE — 84450 TRANSFERASE (AST) (SGOT): CPT | Performed by: OBSTETRICS & GYNECOLOGY

## 2024-12-13 RX ORDER — BISACODYL 10 MG
10 SUPPOSITORY, RECTAL RECTAL DAILY PRN
Status: DISCONTINUED | OUTPATIENT
Start: 2024-12-13 | End: 2024-12-15 | Stop reason: HOSPADM

## 2024-12-13 RX ORDER — MODIFIED LANOLIN
OINTMENT (GRAM) TOPICAL
Status: DISCONTINUED | OUTPATIENT
Start: 2024-12-13 | End: 2024-12-15 | Stop reason: HOSPADM

## 2024-12-13 RX ORDER — IBUPROFEN 800 MG/1
800 TABLET, FILM COATED ORAL EVERY 6 HOURS PRN
Status: DISCONTINUED | OUTPATIENT
Start: 2024-12-13 | End: 2024-12-15 | Stop reason: HOSPADM

## 2024-12-13 RX ORDER — KETOROLAC TROMETHAMINE 30 MG/ML
30 INJECTION, SOLUTION INTRAMUSCULAR; INTRAVENOUS EVERY 6 HOURS
Status: DISCONTINUED | OUTPATIENT
Start: 2024-12-13 | End: 2024-12-15 | Stop reason: HOSPADM

## 2024-12-13 RX ORDER — TRANEXAMIC ACID 10 MG/ML
1 INJECTION, SOLUTION INTRAVENOUS EVERY 30 MIN PRN
Status: DISCONTINUED | OUTPATIENT
Start: 2024-12-13 | End: 2024-12-15 | Stop reason: HOSPADM

## 2024-12-13 RX ORDER — CEFAZOLIN SODIUM 2 G/100ML
2 INJECTION, SOLUTION INTRAVENOUS ONCE
Status: COMPLETED | OUTPATIENT
Start: 2024-12-13 | End: 2024-12-13

## 2024-12-13 RX ORDER — METHYLERGONOVINE MALEATE 0.2 MG/ML
200 INJECTION INTRAVENOUS
Status: DISCONTINUED | OUTPATIENT
Start: 2024-12-13 | End: 2024-12-15 | Stop reason: HOSPADM

## 2024-12-13 RX ORDER — NALBUPHINE HYDROCHLORIDE 20 MG/ML
2.5-5 INJECTION, SOLUTION INTRAMUSCULAR; INTRAVENOUS; SUBCUTANEOUS EVERY 6 HOURS PRN
Status: DISCONTINUED | OUTPATIENT
Start: 2024-12-13 | End: 2024-12-15 | Stop reason: HOSPADM

## 2024-12-13 RX ORDER — CEFAZOLIN SODIUM 2 G/100ML
2 INJECTION, SOLUTION INTRAVENOUS ONCE
Status: CANCELLED | OUTPATIENT
Start: 2024-12-13

## 2024-12-13 RX ORDER — IBUPROFEN 800 MG/1
800 TABLET, FILM COATED ORAL EVERY 6 HOURS
Status: DISCONTINUED | OUTPATIENT
Start: 2024-12-13 | End: 2024-12-15 | Stop reason: HOSPADM

## 2024-12-13 RX ORDER — MISOPROSTOL 200 UG/1
800 TABLET ORAL
Status: DISCONTINUED | OUTPATIENT
Start: 2024-12-13 | End: 2024-12-15 | Stop reason: HOSPADM

## 2024-12-13 RX ORDER — ONDANSETRON 2 MG/ML
4 INJECTION INTRAMUSCULAR; INTRAVENOUS ONCE
Status: COMPLETED | OUTPATIENT
Start: 2024-12-13 | End: 2024-12-13

## 2024-12-13 RX ORDER — ACETAMINOPHEN 325 MG/1
650 TABLET ORAL EVERY 4 HOURS PRN
Status: DISCONTINUED | OUTPATIENT
Start: 2024-12-13 | End: 2024-12-15 | Stop reason: HOSPADM

## 2024-12-13 RX ORDER — CEFAZOLIN SODIUM 1 G
VIAL (EA) INJECTION ONCE
Status: CANCELLED | OUTPATIENT
Start: 2024-12-13 | End: 2024-12-13

## 2024-12-13 RX ORDER — OXYTOCIN 10 [USP'U]/ML
10 INJECTION, SOLUTION INTRAMUSCULAR; INTRAVENOUS
Status: DISCONTINUED | OUTPATIENT
Start: 2024-12-13 | End: 2024-12-15 | Stop reason: HOSPADM

## 2024-12-13 RX ORDER — KETOROLAC TROMETHAMINE 30 MG/ML
30 INJECTION, SOLUTION INTRAMUSCULAR; INTRAVENOUS EVERY 6 HOURS PRN
Status: DISCONTINUED | OUTPATIENT
Start: 2024-12-13 | End: 2024-12-13

## 2024-12-13 RX ORDER — MISOPROSTOL 200 UG/1
400 TABLET ORAL
Status: DISCONTINUED | OUTPATIENT
Start: 2024-12-13 | End: 2024-12-15 | Stop reason: HOSPADM

## 2024-12-13 RX ORDER — BUPIVACAINE HYDROCHLORIDE 2.5 MG/ML
INJECTION, SOLUTION EPIDURAL; INFILTRATION; INTRACAUDAL
Status: COMPLETED | OUTPATIENT
Start: 2024-12-13 | End: 2024-12-13

## 2024-12-13 RX ORDER — DOCUSATE SODIUM 100 MG/1
100 CAPSULE, LIQUID FILLED ORAL DAILY
Status: DISCONTINUED | OUTPATIENT
Start: 2024-12-13 | End: 2024-12-15 | Stop reason: HOSPADM

## 2024-12-13 RX ORDER — LOPERAMIDE HYDROCHLORIDE 2 MG/1
2 CAPSULE ORAL
Status: DISCONTINUED | OUTPATIENT
Start: 2024-12-13 | End: 2024-12-15 | Stop reason: HOSPADM

## 2024-12-13 RX ORDER — OXYTOCIN/0.9 % SODIUM CHLORIDE 30/500 ML
340 PLASTIC BAG, INJECTION (ML) INTRAVENOUS CONTINUOUS PRN
Status: DISCONTINUED | OUTPATIENT
Start: 2024-12-13 | End: 2024-12-15 | Stop reason: HOSPADM

## 2024-12-13 RX ORDER — HYDROCORTISONE 25 MG/G
CREAM TOPICAL 3 TIMES DAILY PRN
Status: DISCONTINUED | OUTPATIENT
Start: 2024-12-13 | End: 2024-12-15 | Stop reason: HOSPADM

## 2024-12-13 RX ORDER — CARBOPROST TROMETHAMINE 250 UG/ML
250 INJECTION, SOLUTION INTRAMUSCULAR
Status: DISCONTINUED | OUTPATIENT
Start: 2024-12-13 | End: 2024-12-15 | Stop reason: HOSPADM

## 2024-12-13 RX ORDER — FENTANYL CITRATE-0.9 % NACL/PF 10 MCG/ML
100 PLASTIC BAG, INJECTION (ML) INTRAVENOUS EVERY 5 MIN PRN
Status: DISCONTINUED | OUTPATIENT
Start: 2024-12-13 | End: 2024-12-13 | Stop reason: HOSPADM

## 2024-12-13 RX ORDER — FENTANYL/ROPIVACAINE/NS/PF 2MCG/ML-.1
PLASTIC BAG, INJECTION (ML) EPIDURAL
Status: DISCONTINUED | OUTPATIENT
Start: 2024-12-13 | End: 2024-12-13 | Stop reason: HOSPADM

## 2024-12-13 RX ORDER — LOPERAMIDE HYDROCHLORIDE 2 MG/1
4 CAPSULE ORAL
Status: COMPLETED | OUTPATIENT
Start: 2024-12-13 | End: 2024-12-13

## 2024-12-13 RX ADMIN — LOPERAMIDE HYDROCHLORIDE 4 MG: 2 CAPSULE ORAL at 14:20

## 2024-12-13 RX ADMIN — ONDANSETRON 4 MG: 2 INJECTION INTRAMUSCULAR; INTRAVENOUS at 14:18

## 2024-12-13 RX ADMIN — CARBOPROST TROMETHAMINE 250 MCG: 250 INJECTION, SOLUTION INTRAMUSCULAR at 14:00

## 2024-12-13 RX ADMIN — Medication 2 MILLI-UNITS/MIN: at 05:29

## 2024-12-13 RX ADMIN — SODIUM CHLORIDE, POTASSIUM CHLORIDE, SODIUM LACTATE AND CALCIUM CHLORIDE: 600; 310; 30; 20 INJECTION, SOLUTION INTRAVENOUS at 11:01

## 2024-12-13 RX ADMIN — LOPERAMIDE HYDROCHLORIDE 2 MG: 2 CAPSULE ORAL at 15:32

## 2024-12-13 RX ADMIN — ACETAMINOPHEN 650 MG: 325 TABLET ORAL at 23:44

## 2024-12-13 RX ADMIN — IBUPROFEN 800 MG: 800 TABLET ORAL at 18:16

## 2024-12-13 RX ADMIN — BUPIVACAINE HYDROCHLORIDE 7 ML: 2.5 INJECTION, SOLUTION EPIDURAL; INFILTRATION; INTRACAUDAL at 10:20

## 2024-12-13 RX ADMIN — ACETAMINOPHEN 650 MG: 325 TABLET ORAL at 19:27

## 2024-12-13 RX ADMIN — MISOPROSTOL 800 MCG: 200 TABLET ORAL at 13:52

## 2024-12-13 RX ADMIN — CEFAZOLIN SODIUM 2 G: 2 INJECTION, SOLUTION INTRAVENOUS at 23:05

## 2024-12-13 RX ADMIN — ONDANSETRON 4 MG: 2 INJECTION INTRAMUSCULAR; INTRAVENOUS at 11:56

## 2024-12-13 RX ADMIN — Medication: at 10:34

## 2024-12-13 RX ADMIN — KETOROLAC TROMETHAMINE 30 MG: 30 INJECTION, SOLUTION INTRAMUSCULAR at 20:12

## 2024-12-13 RX ADMIN — SODIUM CHLORIDE, POTASSIUM CHLORIDE, SODIUM LACTATE AND CALCIUM CHLORIDE: 600; 310; 30; 20 INJECTION, SOLUTION INTRAVENOUS at 05:30

## 2024-12-13 RX ADMIN — ACETAMINOPHEN 650 MG: 325 TABLET ORAL at 14:19

## 2024-12-13 RX ADMIN — LIDOCAINE HYDROCHLORIDE 10 ML: 10 INJECTION, SOLUTION INFILTRATION; PERINEURAL at 13:40

## 2024-12-13 RX ADMIN — TRANEXAMIC ACID 1 G: 10 INJECTION, SOLUTION INTRAVENOUS at 14:00

## 2024-12-13 ASSESSMENT — ACTIVITIES OF DAILY LIVING (ADL)
ADLS_ACUITY_SCORE: 20
ADLS_ACUITY_SCORE: 24
ADLS_ACUITY_SCORE: 20
ADLS_ACUITY_SCORE: 24
ADLS_ACUITY_SCORE: 20
ADLS_ACUITY_SCORE: 24
ADLS_ACUITY_SCORE: 20
ADLS_ACUITY_SCORE: 20

## 2024-12-13 NOTE — ANESTHESIA CARE TRANSFER NOTE
Patient: Tessa Aly    Procedure: Procedure(s):  PLACE ON STANDBY, SURGERY TEAM, FOR  SECTION       Diagnosis: Dichorionic diamniotic twin pregnancy in third trimester [O30.043]  Diagnosis Additional Information: No value filed.    Anesthesia Type:   Epidural     Note:    Oropharynx: oropharynx clear of all foreign objects  Level of Consciousness: awake  Oxygen Supplementation: room air    Independent Airway: airway patency satisfactory and stable  Dentition: dentition unchanged  Vital Signs Stable: post-procedure vital signs reviewed and stable  Report to RN Given: handoff report given    Comments: Report given to RN. Anesthesia only monitored. VSS              Electronically Signed By: CHRIS Luna CRNA  2024  1:44 PM

## 2024-12-13 NOTE — ANESTHESIA PROCEDURE NOTES
Epidural catheter Procedure Note    Pre-Procedure   Staff -        Anesthesiologist:  Stephan Juarez MD       Performed By: CRNA and anesthesiologist       Location: OB       Pre-Anesthestic Checklist: patient identified, IV checked, risks and benefits discussed, informed consent, monitors and equipment checked, pre-op evaluation, at physician/surgeon's request and post-op pain management  Timeout:       Correct Patient: Yes        Correct Procedure: Yes        Correct Site: Yes        Correct Position: Yes   Procedure Documentation  Procedure: epidural catheter       Patient Position: sitting       Patient Prep/Sterile Barriers: sterile gloves, mask, patient draped       Skin prep: Betadine and Chloraprep       Local skin infiltrated with mL of 1% lidocaine.        Insertion Site: L3-4. (midline approach).       Technique: LORT saline and LORT air        Needle Type: LawnStartery needle       Needle Gauge: 17.        Needle Length (Inches): 3.5        Catheter: 20 G.          Catheter threaded easily.             # of attempts: 1 and  # of redirects:  0    Assessment/Narrative         Paresthesias: No.       Test dose of 2 mL lidocaine 1.5% w/ 1:200,000 epinephrine at.         Test dose negative, 3 minutes after injection, for signs of intravascular, subdural, or intrathecal injection.       Insertion/Infusion Method: LORT saline and LORT air       Aspiration negative for Heme or CSF via Epidural Catheter.    Medication(s) Administered   0.25% Bupivacaine PF (Epidural) - EPIDURAL   7 mL - 12/13/2024 10:20:00 AM   Comments:  Epi   R/b/a discussed, patient agrees to have an epidural catheter placement.   Local infiltration of skin, advancement of needle without paresthesias, excellent Jessica to NS.   Catheter advanced without resistance nor paresthesias, negative aspiration for heme or CSF.  Remaining 2 mL epidural test dose diluted with 3 mL sterile water and given via epidural bolus.  Patient tolerated the procedure well,  "all questions answered.        FOR [facility] ([facility]) ONLY:   Pain Team Contact information: please page the Pain Team Via [facility]. Search \"Pain\". During daytime hours, please page the attending first. At night please page the resident first.      "

## 2024-12-13 NOTE — PROGRESS NOTES
Tessa became dizzy, hot, nausea/vomiting then started seeing flashing lights at about 1140, noted to be very pale. BP stable, position changed to right side. IV fluid bolus initiated, IV pit rate decreased again by half to 3 mu/min, epidural paused. Zofran admin at 1155, MDA in to eval pt at 1205. Pt repositioned back to left side to aid in tracing babies. Tessa is now feeling better.

## 2024-12-13 NOTE — ANESTHESIA PREPROCEDURE EVALUATION
"Anesthesia Pre-Procedure Evaluation    Patient: Tessa Aly   MRN: [id number] : 1990        Procedure :           History reviewed. No pertinent past medical history.   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\"      No Known Allergies   Social History     Tobacco Use    Smoking status: Never    Smokeless tobacco: Never   Substance Use Topics    Alcohol use: Not Currently      Wt Readings from Last 1 Encounters:   24 58.1 kg (128 lb)        Anesthesia Evaluation   Pt has not had prior anesthetic         ROS/MED HX  ENT/Pulmonary:  - neg pulmonary ROS     Neurologic:  - neg neurologic ROS     Cardiovascular:     (+)  hypertension- -   -  - -                                      METS/Exercise Tolerance:     Hematologic: Comments: Thrombocytopenia       Musculoskeletal:  - neg musculoskeletal ROS     GI/Hepatic:  - neg GI/hepatic ROS     Renal/Genitourinary:  - neg Renal ROS     Endo:  - neg endo ROS     Psychiatric/Substance Use:  - neg psychiatric ROS     Infectious Disease:  - neg infectious disease ROS     Malignancy:  - neg malignancy ROS     Other:            Physical Exam    Airway  airway exam normal           Respiratory Devices and Support         Dental       (+) Minor Abnormalities - some fillings, tiny chips      Cardiovascular   cardiovascular exam normal          Pulmonary                   OUTSIDE LABS:  CBC:   Lab Results   Component Value Date    WBC 7.9 2024    WBC 8.7 2024    HGB 12.3 2024    HGB 12.9 2024    HCT 38.4 2024    HCT 39.0 2024     (L) 2024     (L) 2024     BMP:   Lab Results   Component Value Date     2024     2024    POTASSIUM 4.6 2024    POTASSIUM 5.0 2024    CHLORIDE 105 2024    CHLORIDE 108 (H) 2024    CO2 20 (L) 2024    CO2 19 (L) 2024    BUN 11.0 " 12/12/2024    BUN 13.5 02/11/2024    CR 0.80 12/13/2024    CR 0.81 12/12/2024    GLC 64 (L) 12/12/2024    GLC 96 02/11/2024     COAGS:   Lab Results   Component Value Date    PTT 30 12/13/2024    INR 0.93 12/13/2024     POC:   Lab Results   Component Value Date    HCGS Negative 02/11/2024     HEPATIC:   Lab Results   Component Value Date    ALBUMIN 3.2 (L) 12/12/2024    PROTTOTAL 6.3 (L) 12/12/2024    ALT 9 12/13/2024    AST 21 12/13/2024    ALKPHOS 217 (H) 12/12/2024    BILITOTAL 0.3 12/12/2024     OTHER:   Lab Results   Component Value Date    A1C 5.4 06/20/2024    THONG 8.6 (L) 12/12/2024    LIPASE 24 02/11/2024    TSH 0.75 11/21/2022       Anesthesia Plan    ASA Status:  2       Anesthesia Type: Epidural.              Consents    Anesthesia Plan(s) and associated risks, benefits, and realistic alternatives discussed. Questions answered and patient/representative(s) expressed understanding.     - Discussed:     - Discussed with:  Patient            Postoperative Care            Comments:               Stephan Juarez MD    I have reviewed the pertinent notes and labs in the chart from the past 30 days and (re)examined the patient.  Any updates or changes from those notes are reflected in this note.       # Hypocalcemia: Lowest Ca = 8.6 mg/dL in last 2 days, will monitor and replace as appropriate     # Hypoalbuminemia: Lowest albumin = 3.2 g/dL at 12/12/2024  5:06 PM, will monitor as appropriate   # Drug Induced Platelet Defect: home medication list includes an antiplatelet medication   # Hypertension: Noted on problem list

## 2024-12-13 NOTE — PLAN OF CARE
Goal Outcome Evaluation:      Plan of Care Reviewed With: patient, spouse    Overall Patient Progress: improvingOverall Patient Progress: improving    Outcome Evaluation: Able to make needs known. up independent. A&Ox4.    Cat 1 strip before bedtime. VSS on room air. Occasional contractions, mild, states she has been having these contractions all week without any other changes.   Plan for induction in the AM

## 2024-12-13 NOTE — ANESTHESIA POSTPROCEDURE EVALUATION
Patient: Tessa Aly    Procedure: Procedure(s):  PLACE ON STANDBY, SURGERY TEAM, FOR  SECTION       Anesthesia Type:  Epidural    Note:  Disposition: Inpatient   Postop Pain Control: Uneventful            Sign Out: Well controlled pain   PONV: No   Neuro/Psych: Uneventful            Sign Out: Acceptable/Baseline neuro status   Airway/Respiratory: Uneventful            Sign Out: Acceptable/Baseline resp. status   CV/Hemodynamics: Uneventful            Sign Out: Acceptable CV status; No obvious hypovolemia; No obvious fluid overload   Other NRE: NONE   DID A NON-ROUTINE EVENT OCCUR? No           Last vitals:  Vitals:    24 1105 24 1110 24 1234   BP: 121/75 128/83 101/63   Pulse:      Resp: 16 16    Temp:      SpO2:  97% 99%       Electronically Signed By: Stephan Juarez MD  2024  1:37 PM

## 2024-12-13 NOTE — PROGRESS NOTES
Progress Note    Doing well. No complaints.     Temp:  [97.8  F (36.6  C)-98.2  F (36.8  C)] 97.8  F (36.6  C)  Pulse:  [75-81] 75  Resp:  [16-18] 16  BP: (107-145)/(65-92) 116/79  SpO2:  [97 %-99 %] 97 %    NAD, AAO x 3  Abdomen soft, non tender  Cervix: 3/80/-2/cephalic/BOWI    FHTs: Cat I x 2    AROM: Clear fluid    A/P: 33 yo  at 37w 3d undergoing induction for mild preeclampsia with Di/Di twins  -- S/p AROM  -- On pitocin per protocol  -- Anticipate   -- Planning epidural. Has had gestational thrombocytopenia. Plts 127.     Peyton Brady MD, FACOG  P) [phone]

## 2024-12-13 NOTE — PROGRESS NOTES
Progress Note     Doing well. No complaints.     Temp:  [97.8  F (36.6  C)-98.2  F (36.8  C)] 98.1  F (36.7  C)  Pulse:  [75-92] 92  Resp:  [16-18] 16  BP: ()/(56-99) 130/93  SpO2:  [95 %-99 %] 98 %    NAD, AAO x 3  Abdomen soft, non tender  Uterus firm, below the umbilicus  Sakshi in place  Funes in place    Hgb: 12.3 --> 11.9    A/P: 35 yo PPD#0 s/p VAVD x 2 with post partum hemorrhage  -- Sakshi in place. Clamped and did not bleed around the Sakshi. There is 150 ml in the canister so will leave Sakshi on suction for a few more hours.   -- EBL is likely 850-1000 ml now    Peyton Brady MD, FACOG  P) [phone]

## 2024-12-13 NOTE — PROCEDURES
Sakshi Procedure note    Date: 12/13/2024    Indication: Postpartum hemorrhage due to uterine atony    Procedure:Sakshi Placement    Technique: The patient was evaluated for lacerations, retained products of conception, or other causes of bleeding.  A manual sweep was performed with the patient's consent to evaluate and remove any organized clots prior to using Sakshi. The bladder was emptied by placing a Funes catheter in the usual sterile fashion. A syringe was attached to the cervical seal and all air removed before placement. The cervix was visualized and confirmed to be at least 3 cm dilated. The intrauterine loop was compressed manually and inserted transvaginally into the uterus and seal valve oriented and 3 or 9 o clock. The cervical seal was noted to be in the vagina at the external os. The cervical seal was then filled with 120 ml sterile saline. Suction was turned on and verified at 80 mm Hg by occluding the end of the tubing.  Suction attached to Sakshi.  Sakshi was taped securely to patient's inner leg.  Fundus remains firm and at umbilicus.      Plan to recheck Sakshi in one hour after placement to evaluate for removal if patient stable, bleeding improved and fundus firm at which time the saline will be removed from the cervical seal and vacuum detached from the Sakshi device.  While Sakshi is still in place, the patient's bleeding will be monitored for at least 30 minutes before removing the Sakshi device.      Peyton Brady MD  12/13/2024 2:20 PM

## 2024-12-13 NOTE — PROCEDURES
VAGINAL DELIVERY NOTE      Tessa Aly MRN# [id number]   Age: 34 year old YOB: 1990     PRE DELIVERY DIAGNOSIS  1) Intrauterine pregnancy at 37w 3d   2) mild preeclampsia without severe features  3) AMA just  4) gestational thrombocytopenia  5) di/di twins      POST DELIVERY DIAGNOSIS  1) Intrauterine pregnancy at 37w 3d   2) mild preeclampsia without severe features  3) AMA just  4) gestational thrombocytopenia  5) di/di twins  6) Delivery of a living male and female infants     1 Minute 5 Minute 10 Minute   Apgar Totals:    Rhoda Aly [[id number]]   9      Jermain FemaleROSELIA Zarate [[id number]]   9      Jermain MaleTammyA Tessa [[id number]]   9      Jermain, Female-B Tessa [[id number]]   9      Jermain MaleBRAYAN Zarate [[id number]]         Jermain FemaleROSELIA Zarate [[id number]]          3) Weight:     Rhoda Aly [[id number]]   2.9 kg (6 lb 6.3 oz)      Jermain FemaleROSELIA Zarate [[id number]]   2.51 kg (5 lb 8.5 oz)  4) Postpartum hemorrhage: Yes - TXA, Hemabate x 1, Sakshi, 800 mcg rectal Cytotec, butts  5)      Rhoda Aly [[id number]]   2nd      Jean Aly [[id number]]   2nd    Delivery Method/Type:      Rhoda Aly [[id number]]   Vaginal, Vacuum (Extractor)      Jean Aly [[id number]]   Vaginal, Vacuum (Extractor)    PROVIDER:   Peyton Brady MD     ANESTHESIA:       Rhoda Aly [[id number]]   epidural      Jean Aly [[id number]]   epidural    Labor Complications:      Rhoda Aly [[id number]]   None      Jermain FemaleROSELIA Zarate [[id number]]   None  Delivery QBL: 500 mL 500   ROM to Delivery Time: Baby 1: (Delivered) Hours: 3 Minutes: 27  Baby 2: (Delivered) Minutes: 4    DESCRIPTION OF PROCEDURE  Tessa Aly is a 34 year old  with prenatal care with myself who was admitted at 3 cm for elevated blood pressure.  She was noted to have proteinuria.  She was kept and induced for mild preeclampsia without severe features.   Pitocin was started.  AROM was performed at 3 cm.  She progressed to complete with Pitocin.  She was brought into the operating room for delivery.    Baby A had FHTs in the 1 teens prior to going back.  We had difficulty monitoring the baby's back in the operating room.  An ultrasound was performed and if FHTs confirmed on both A and B.  We continued to have difficulty monitoring baby A.  There was some question of D cells so I discussed a vacuum-assisted delivery with Tessa and her .  Risks and benefits were reviewed.  Verbal consent was obtained.  Baby was 2+ station OA and an estimated fetal weight of less than 7 pounds.  Funes catheter had just been removed.  Flat Kiwi was placed.  Proper positioning was confirmed.  I pulled 1 time for 20 seconds with 0 pop-off's.  The vacuum was removed.  The remainder of the infant delivered.  There was spontaneous cry.  Cord was clamped and cut after 1 minute.  Infant was transferred to the warmer.    Presentation of PE was confirmed.  B was cephalic.  FHTs were in the 140s.  Tessa pushed and brought the head into the pelvis.  AROM was performed.  Baby B had D cells to the 80s.  I discussed a vacuum-assisted delivery of baby B.  Verbal consent was obtained.  I placed the Kiwi vacuum.  Proper positioning was confirmed.  I pulled 1 time for 15 seconds with 0 pop-off's.  The head was delivered.  The vacuum was removed.  The remainder of the infant delivered.  There is spontaneous cry.  The cord was clamped and cut after 1 minute.  Placenta a had 1 clamped.  Placenta B was marked with 2 clamps.  The uterus was massaged and firm.    There was a second-degree vaginal laceration in the midline.  This was repaired with 3-0 chromic.  There is a second-degree perineal laceration that was repaired with 3-0 chromic.    The uterus became atonic and bleeding increased.  We placed a second IV.  TXA was given.  800 of rectal Cytotec was placed.  A Funes catheter was placed.  Uterine  curettage was performed and clots were removed.  The uterus was noted to be atonic.  A Emily was placed.  Please see Emily note.  There was a small first-degree midline periurethral laceration.  This was repaired with 2 figure-of-eight stitches.    1 dose of Hemabate was given in the OR.  The uterus was firm.  There was blood in the tubing but minimal blood in the canister.  The Emily was clamped.  The patient was transferred to a bed.  She was brought back into the room.     She was dizzy and lightheaded.  Blood pressure was checked and was normotensive.  Uterus was firm and below the umbilicus.  Some blood went into the canister with rehooking up the suction from the Emily.    Will monitor patient closely.       Mustapha AlyA Tessa [[id number]]   9      Jermain, Female-B Tessa [[id number]]   9  and      Jermain Male-A Tessa [[id number]]   9      Jermain, Female-B Tessa [[id number]]   9 . Delivery was via      Jermain Male-A Tessa [[id number]]   vaginal, vacuum (extractor)      Jermain, Female-B Tessa [[id number]]   vaginal, vacuum (extractor) under      Jermain Male-A Tessa [[id number]]   epidural      Jermain, Female-B Tessa [[id number]]   epidural anesthesia. Infant delivered in      Jermain Male-A Tessa [[id number]]         Jermain, Female-B Tessa [[id number]]   vertex      Jermain Male-A Tessa [[id number]]         Jermain, Female-B Tessa [[id number]]          Jermain, Male-A Tessa [[id number]]         Jermain, Female-B Tessa [[id number]]            Jermain, Male-A Tessa [[id number]]   3 vessels      Jermain, Female-B Tessa [[id number]]   3 vessels were noted.     Cord complications:      hRoda Alyen [[id number]]         Jermain, Female-B Tessa [[id number]]      Placenta delivered at      Rhoda Aly [[id number]]   12/13/2024  1:41 PM      Jermain Female-B Tessa [[id number]]   12/13/2024  1:42 PM. Placental disposition was      Rhoda Alyen [[id number]]         Jermain, Female-BEE Zarate [[id number]]         Episiotomy:  None     Jermain, Male-A Tessa [[id number]]   none      Jermain, Female-B Tessa [[id number]]   none   Perineum, vagina, cervix were inspected, and the following lacerations were noted:   Perineal lacerations:      Jermain, Male-A Etssa [[id number]]   2nd      Jermain, Female-B Tessa [[id number]]   2nd     Jermain, Male-A Tessa [[id number]]   periurethral laceration: left      Jermain, Female-B Tessa [[id number]]   periurethral laceration: left      Jermain, Male-A Tessa [[id number]]         Jermain, Female-B Tessa [[id number]]          Jermain, Male-A Tessa [[id number]]         Jermain, Female-B Tessa [[id number]]          Jermain, Male-A Tessa [[id number]]         Jeramin, Female-B Tessa [[id number]]          Jermain, Male-A Tessa [[id number]]         Jermain, Female-B Tessa [[id number]]        There was a second-degree perineal laceration.  This was repaired with 3-0 chromic.  There was a second-degree vaginal laceration that was repaired with 3-0 chromic there was a first-degree periurethral laceration that was repaired with 4-0 Vicryl.    Hemostasis was noted. Needle count correct. Infant and patient in delivery room in stable condition.      Peyton Brady MD          Rhoda Aly [[id number]]      Labor Event Times      Active labor onset date: 24 Onset time:  9:48 AM   Dilation complete date: 24 Complete time: 12:34 PM   Start pushing date/time: 2024 1256          Labor Events     labor?: No   steroids: None  Labor Type: Induction/Cervical ripening, Augmentation       Rupture date/time: 24   Rupture type: Artificial Rupture of Membranes  Fluid color: Clear  Fluid odor: Normal     Induction: Oxytocin  Induction date/time: 2430    Cervical ripening date/time:      Indications for induction: Mild Preeclampsia     Augmentation: AROM  Augmentation date/time: 2448          Delivery/Placenta Date and Time      Delivery Date: 24 Delivery Time:  1:15 PM   Placenta  Date/Time: 2024  1:41 PM  Oxytocin given at the time of delivery: after delivery of placenta  Delivering clinician: Peyton Brady MD   Other personnel present at delivery:  Provider Role   Cesilia Owens, Lola Martinez, RN Schoenberg, Kelsey GROVE, NELA Quintana, Alexa Raymond, NELA Servin, CHRIS Smith CNP, Michelle M, Peace Simon, RT    Shantel Hadley, RT              Vaginal Counts       Initial count performed by 2 team members:  Two Team Members   abel quintana         Needles Suture Needles Sponges (RETIRED) Instruments   Initial counts 0 0 5    Added to count       Relief counts       Final counts               Placed during labor Accounted for at the end of labor   FSE Yes Yes   IUPC No NA   Cervidil No NA                             Apgars    Living status: Living   1 Minute 5 Minute 10 Minute 15 Minute 20 Minute   Skin color: 1  1       Heart rate: 2  2       Reflex irritability: 2  2       Muscle tone: 2  2       Respiratory effort: 2  2       Total: 9  9       Apgars assigned by: KRISTEN PEREZ CNP       Cord      Vessels: 3 Vessels                Cord Blood Disposition: Discard    Gases Sent?: No    Delayed cord clamping?: Yes    Cord Clamping Delay (seconds):  seconds            Resuscitation    Methods: None  [place] Care at Delivery: Asked by Dr. Brady to attend the delivery of this term, male infant with a gestational age of 37 3/7 weeks secondary to twin gestation and instrumented delivery.      Infant received delayed cord clamping for 60 seconds. Infant had spontaneous respirations at birth. Baby was placed on a warmer, dried, stimulated, and bulb suctioned at birth. Apgars were 9 at one minute and 9 at five minutes of age. Gross PE is WNL except for head molding. Infant remains in the care of the L and D staff.     Kristen PEREZ CNP 2024 1:43 PM         Measurements      Weight: 6 lb 6.3 oz Length:  "1' 7.69\"     Head circumference: 33 cm           Labor Events and Shoulder Dystocia    Fetal Tracing Prior to Delivery: Category 2  Shoulder dystocia present?: Neg       Delivery (Maternal) (Provider to Complete) (354482)    Episiotomy: None  Perineal lacerations: 2nd Repaired?: Yes     Periurethral laceration: left Repaired?: Yes   Repair suture: 3-0 Chromic, 4-0 Vicryl  Number of repair packets: 3  Genital tract inspection done: Pos       Blood Loss  Mother: Tessa Aly #[id number]     Start of Mother's Information      Delivery Blood Loss   Intrapartum & Postpartum: 12/13/24 0948 - 12/13/24 1421    Delivery Admission: 12/12/24 1623 - 12/13/24 1421         Intrapartum & Postpartum Delivery Admission    Delivery QBL (mL) Hospital Encounter 500 mL 500 mL    Total  500 mL 500 mL               End of Mother's Information  Mother: Tessa Aly #[id number]                Delivery - Provider to Complete ([id number])    Delivering clinician: Peyton Brady MD  Delivery Type (Choose the 1 that will go to the Birth History): Vaginal, Vacuum (Extractor)    Verbal Informed Consent Obtained For Vacuum: Yes       Type (vacuum): kiwi Accrued Pulling Time (vacuum): 20sec        # of Pop-Offs (vacuum): 0 # of Pulls/Contractions (vacuum): 1        Indication for Operative Vaginal Delivery (vacuum): Fetal Status                Other personnel:  Provider Role   Cesilia Owens, RN Hephner, Gracie, RN Schoenberg, Karen J, RN Hedstrom, Alexa Raymond RN Shand, Molly J, APRN Qian Enrique, Peace Simon, RT    Baltierregrisel, Shantel, RT                     Placenta    Date/Time: 12/13/2024  1:41 PM             Anesthesia    Method: Epidural                        Jean Aly [[id number]]      Labor Event Times      Active labor onset date: 12/13/24 Onset time:  9:48 AM CST   Dilation complete date: 12/13/24 Complete time: 12:34 PM CST   Start pushing date/time: 12/13/2024 1256      "     Labor Events     labor?: No  Predominate monitoring during 1st stage: continuous electronic fetal monitoring     Rupture identifier: Sac 2  Rupture date/time: 24 1327   Rupture type: Artificial Rupture of Membranes  Fluid color: Clear  Fluid odor: Normal     Induction: Oxytocin  Induction date/time:      Cervical ripening date/time:      Indications for induction: Mild Preeclampsia     Augmentation: AROM       Delivery/Placenta Date and Time      Delivery Date: 24 Delivery Time:  1:31 PM   Placenta Date/Time: 2024  1:42 PM  Oxytocin given at the time of delivery: after delivery of placenta  Delivering clinician: Peyton Brady MD   Other personnel present at delivery:  Provider Role   Cesilia Owens, Lola Martinez, RN Schoenberg, Kelsey GROVE, NELA Quintana, Alexa Raymond RN Shand, CHRIS Smith CNP, Michelle M, RN Lewis, Erin, RT    Baltierrez, Shantel, RT              Vaginal Counts       Initial count performed by 2 team members:  Two Team Members   abel quintana         Needles Suture Needles Sponges (RETIRED) Instruments   Initial counts 0 0 5    Added to count       Relief counts       Final counts               Placed during labor Accounted for at the end of labor   FSE Yes Yes   IUPC No NA   Cervidil No NA                             Apgars       1 Minute 5 Minute 10 Minute 15 Minute 20 Minute   Skin color: 1  1       Heart rate: 2  2       Reflex irritability: 2  2       Muscle tone: 2  2       Respiratory effort: 2  2       Total: 9  9       Apgars assigned by: ABBY PEREZ,ASHA       Cord      Vessels: 3 Vessels                Cord Blood Disposition: Discard    Gases Sent?: No    Delayed cord clamping?: Yes    Cord Clamping Delay (seconds):  seconds            Resuscitation    Methods: None  [place] Care at Delivery: Asked by Dr. Martins to attend the delivery of this term, female infant with a gestational age of  "37 3/7 weeks secondary to twin gestation and instrumented delivery.      Infant received delayed cord clamping for 60 seconds. Infant had spontaneous respirations at birth. Baby was placed on a warmer, dried, stimulated, and bulb suctioned at birth. Apgars were 9 at one minute and 9 at five minutes of age. Gross PE is WNL except for head molding. Infant remains in the care of the L and D staff.    Kristen PEREZ, ASHA 2024 1:54 PM         Measurements      Weight: 5 lb 8.5 oz Length: 1' 7.09\"     Head circumference: 33.5 cm           Labor Events and Shoulder Dystocia    Fetal Tracing Prior to Delivery: Category 2  Shoulder dystocia present?: Neg       Delivery (Maternal) (Provider to Complete) ([id number])    Episiotomy: None  Perineal lacerations: 2nd Repaired?: Yes     Periurethral laceration: left Repaired?: Yes   Repair suture: 3-0 Chromic, 4-0 Vicryl  Number of repair packets: 3  Genital tract inspection done: Pos       Blood Loss  Mother: Tessa Aly #[id number]     Start of Mother's Information      Delivery Blood Loss   Intrapartum & Postpartum: 24 0948 - 24 1421    Delivery Admission: 24 1623 - 24 1421         Intrapartum & Postpartum Delivery Admission    Delivery QBL (mL) Hospital Encounter 500 mL 500 mL    Total  500 mL 500 mL               End of Mother's Information  Mother: Tessa Aly #[id number]                Delivery - Provider to Complete ([id number])    Delivering clinician: Peyton Brady MD  Delivery Type (Choose the 1 that will go to the Birth History): Vaginal, Vacuum (Extractor)    Verbal Informed Consent Obtained For Vacuum: Yes       Type (vacuum): kiwi Accrued Pulling Time (vacuum): 15        # of Pop-Offs (vacuum): 0 # of Pulls/Contractions (vacuum): 1        Indication for Operative Vaginal Delivery (vacuum): Fetal Status                Other personnel:  Provider Role   Cesilia Owens, RN Hephner, Gracie, RN Schoenberg, Karen J, NELA  "   Rosey Quintana Kristin N, Kristen Arevalo APRN CNP Rama, Michelle M, RN    Peace Molina, RT    Shantel Hadley, RT                     Placenta    Date/Time: 12/13/2024  1:42 PM             Anesthesia    Method: Epidural                    Presentation and Position    Presentation: Vertex

## 2024-12-13 NOTE — PROGRESS NOTES
[facility] Labor and Delivery Progress Note    Tessa Aly MRN# [id number]   Age: 34 year old YOB: 1990           Subjective:   Sam is manageable, denies visual changes, endorses mild edema and good fm, off and on GERd which is how it has been for her, using TUMS           Objective:   Patient Vitals for the past 24 hrs:   BP Oximeter Heart Rate   24 1732 120/82 76 bpm   24 1702 (!) 141/92 75 bpm   24 1639 (!) 134/92 78 bpm         Cervical Exam:  3cm/80 % in clinic, cephalic    Membranes:   intact    Fetal Heart Rate:    Monitor: external US    Variability: moderate (amplitude range 6 to 25 bpm)    Baseline Rate: normal range    Fetal Heart Rate Tracing: cat 1 x 2    LFTS normal, Cr 0.81, Platelets 136K (had been 133 K in Nov) urine protein/Cr 0.47 mg          Assessment:   Tessa Aly is a 34 year old  who is 37w2d here with new onset pre-eclampsia based on elevated Bps and proteinuria, has had known thrombocytopenia but will watch with serial labs for possible HELLP, of note patient does desire epidural. GBS neg          Plan:   Admit - see IP orders, FULL CODE, treat Bps if severe, discussed with patient and partner recommendation to move toward delivery, will initiate low dose pitocin starting in early AM, repeat labs and coags then.  Magnesium deferred unless severe range Bps or signs/symptoms change.  Update Dr. Brady.      Peyton Isaacs MD

## 2024-12-13 NOTE — PLAN OF CARE
Problem: Labor  Goal: Effective Progression to Delivery  Outcome: Progressing     Problem: Labor  Goal: Acceptable Pain Control  Outcome: Progressing       Goal Outcome Evaluation:      Plan of Care Reviewed With: patient    Overall Patient Progress: improvingOverall Patient Progress: improving    Outcome Evaluation: Patient reports she slept well during the night. VSS, BPs have remained WNL. Denies HA/vision changes/epigastric pain. Reflexes normal, clonus absent. Category I tracing. Occasional contractions. Planning an epidural for pain. Labs drawn this AM and pending.

## 2024-12-14 LAB — HGB BLD-MCNC: 10.4 G/DL (ref 11.7–15.7)

## 2024-12-14 PROCEDURE — 250N000011 HC RX IP 250 OP 636: Performed by: OBSTETRICS & GYNECOLOGY

## 2024-12-14 PROCEDURE — 85018 HEMOGLOBIN: CPT | Performed by: OBSTETRICS & GYNECOLOGY

## 2024-12-14 PROCEDURE — 36415 COLL VENOUS BLD VENIPUNCTURE: CPT | Performed by: OBSTETRICS & GYNECOLOGY

## 2024-12-14 PROCEDURE — 250N000013 HC RX MED GY IP 250 OP 250 PS 637: Performed by: OBSTETRICS & GYNECOLOGY

## 2024-12-14 PROCEDURE — 250N000009 HC RX 250: Performed by: OBSTETRICS & GYNECOLOGY

## 2024-12-14 PROCEDURE — 120N000001 HC R&B MED SURG/OB

## 2024-12-14 RX ADMIN — ACETAMINOPHEN 650 MG: 325 TABLET ORAL at 11:44

## 2024-12-14 RX ADMIN — ACETAMINOPHEN 650 MG: 325 TABLET ORAL at 07:30

## 2024-12-14 RX ADMIN — ACETAMINOPHEN 650 MG: 325 TABLET ORAL at 19:50

## 2024-12-14 RX ADMIN — ACETAMINOPHEN 650 MG: 325 TABLET ORAL at 23:56

## 2024-12-14 RX ADMIN — KETOROLAC TROMETHAMINE 30 MG: 30 INJECTION, SOLUTION INTRAMUSCULAR at 03:35

## 2024-12-14 RX ADMIN — ACETAMINOPHEN 650 MG: 325 TABLET ORAL at 03:35

## 2024-12-14 RX ADMIN — EPSOM SALT: 1 GRANULE ORAL; TOPICAL at 03:27

## 2024-12-14 RX ADMIN — IBUPROFEN 800 MG: 800 TABLET ORAL at 21:49

## 2024-12-14 RX ADMIN — IBUPROFEN 800 MG: 800 TABLET ORAL at 09:21

## 2024-12-14 RX ADMIN — IBUPROFEN 800 MG: 800 TABLET ORAL at 15:47

## 2024-12-14 RX ADMIN — ACETAMINOPHEN 650 MG: 325 TABLET ORAL at 15:47

## 2024-12-14 ASSESSMENT — ACTIVITIES OF DAILY LIVING (ADL)
ADLS_ACUITY_SCORE: 27
ADLS_ACUITY_SCORE: 28
ADLS_ACUITY_SCORE: 27
ADLS_ACUITY_SCORE: 28
ADLS_ACUITY_SCORE: 27
ADLS_ACUITY_SCORE: 26
ADLS_ACUITY_SCORE: 27
ADLS_ACUITY_SCORE: 26
ADLS_ACUITY_SCORE: 28
ADLS_ACUITY_SCORE: 26
ADLS_ACUITY_SCORE: 26
ADLS_ACUITY_SCORE: 27
ADLS_ACUITY_SCORE: 28
ADLS_ACUITY_SCORE: 28
ADLS_ACUITY_SCORE: 27
ADLS_ACUITY_SCORE: 28
ADLS_ACUITY_SCORE: 27

## 2024-12-14 NOTE — PLAN OF CARE
Goal Outcome Evaluation:      Plan of Care Reviewed With: patient    Overall Patient Progress: improvingOverall Patient Progress: improving    Outcome Evaluation: vss, hgb 10.4, up ad maria teresa  in room. has voided 150 ml since butts came out at noon.    Swollen perineum has improved throughout the day. Pt has been able to void once. She is aware that she must pass trial urine of 200 twice to stop measurement. Pain is 2-4 using tylenol and ibuprofen. PIV remains in left forearm since bleeding has been stable possibly remove this evening.   Tessa is working hard at feeding babies with lots of help from her  to offer donor milk. Tessa was able to get a 2 hour nap this afternoon and appears much more rested.

## 2024-12-14 NOTE — PLAN OF CARE
Goal Outcome Evaluation: Progressing      Plan of Care Reviewed With: patient, spouse    Overall Patient Progress: improvingOverall Patient Progress: improving    Outcome Evaluation: Tessa's VSS and assessments WDL.  She has a butts catheter in until tomorrow morning per patient care order and is putting out adequate amounts of urine.  She had a ALLEN placed at 1400; removed at 2030; light rubra lochia with FFU/-1 to U.  Her perinuem of very swollen and bruised and painful to the touch. She's using ice packs, and Mag packs were just ordered.  Tessa is sleepy, reporting that she's gotten only 3 hours of sleep in the last 2 days, and has asked that babies get donor milk for a feeding so she can sleep.  Both babies latch well at breast; Tessa's nipples are intact and comfortable.   Her  Angel is ably caring for babies.    Problem: Postpartum (Vaginal Delivery)  Goal: Successful Parent Role Transition  Outcome: Progressing     Problem: Postpartum (Vaginal Delivery)  Goal: Hemostasis  Outcome: Progressing       Problem: Postpartum (Vaginal Delivery)  Goal: Optimal Pain Control and Function  Outcome: Progressing  Intervention: Prevent or Manage Pain  Recent Flowsheet Documentation  Taken 12/13/2024 2012 by Sada Wright, RN  Pain Management Interventions: medication (see MAR)  Taken 12/13/2024 1816 by Sada Wright, RN  Pain Management Interventions: medication (see MAR)

## 2024-12-14 NOTE — PROGRESS NOTES
Dr. Early was called at 2000 to discuss removal of ALLEN and patient's pain.  Dr. Early said that ALLEN can be taken out now. Patient's butts cath should remain until tomorrow morning due to extreme swelling of her perineum. Even though patient was given Ibuprofen at 1815, Dr. Early ordered a dose of Toradol 30 mg now, then Q 6 hours instead of Ibuprofen.  Dr. Early was made aware that patient did not receive antibiotics secondary to uterine sweep prior to ALLEN placement or ALLEN placement itself.

## 2024-12-14 NOTE — LACTATION NOTE
Initial Lactation Visit    Current age: 22 hours old    Gestational age at delivery: 37w3d     Diaper count: A: 2 wet 4 soiled meconium color  B: 2 wet 4 soiled Meconium color     Feedings: A: 8 breast  1 supplement  5ml human donor milk   B: 7 breast  2 supplement  5-10ml maternal expressed breastmilk     Weight Loss:     Breastfeeding goals:undecided, wants to breastfeed but uncertain how long    Past breastfeeding experience:  8 & 11 year for 1 year without issue    Maternal health risk that may affect breastfeeding:Hypertension, (Had IVF for paternal vasectomy, not infertility)    Home Pump: Unimom Opera    Breast assessment: Breast: Round and Soft ,  Nipples: Everted and Intact    Infant oral assessment:     Feeding assessment: A: Tessa reports she's been very wakeful and feeding with lots of swallows. Infant able to achieve a deep latch after a few tries. Infant maintaining rhythmic sucking pattern with a few intermittent swallows. Recommended starting supplementation and if infant does have good feeding with rhythmic swallows she may not need to at some feedings.     B: Infant woken at feeding time and brought to breast. After some stimulation to wake, infant able to latch. Latch was shallow and pointed out to Tessa. She reports no pain and did not want to relatch. Infant needed frequent stimulation to stay awake and no audible swallows. Recommended starting regular supplementation.     Assisted FOB with sidelying bottle feeding. He reports baby would take 5 and take a break for 30 minutes. Encouraged to pace feeding and help infant take full volume at one time.     Feeding plan: Alternate which baby goes to breast and offer both sides OR both babies breastfeed and get one side. Alternate which baby goes to each breast each time. Pump both breasts.     Feed every 2-3 hours. Keep breastfeeding efficient. If infant does not latch within 5-10 minutes, or infant sleepy at breast, or not transferring  milk then end feeding at breast.   Positioning reminders:  line up baby's nose to nipple   ear, shoulder, hip, nice straight line   chin off bay's chest; chin touching your breast prior to latch  your thumb lined up like baby's mustache, fingers under breast like a baby's beard  cheeks touching breast  Signs of milk transfer: hearing swallows, comfortable latch, meeting output goals and softening of breasts.   Supplement baby after every breastfeeding with colostrum/breastmilk ( If colostrum/breastmilk is not available, then donor milk or formula may be used) Use below as a guideline; give more as baby cues.    0-48 hours 5-15 ml each feeding  48-72 hours 15-30 ml each feeding  72-96 hours 30-60 ml each feeding  96+ hours        and older follow healthcare providers recommendation    Pump for 15-20 minutes   Follow up with your healthcare provider as recommended and lactation consultant within 2-3 days after discharge.          Education:   [x] Expected  feeding patterns in the first few days (pg. 38 of Your Guide to To Postpartum and [facility])/ the Second Night  [] Stages of milk production  [x] Hand expression   [x] Feeding cues     [x] Benefits of skin to skin  [x] Breastfeeding positions  [x] Tips to get and maintain a deep latch  [] Usage of nipple shield  [x] Nutritive vs.non-nutritive sucking  [x] Gentle breast compressions as needed  [] How to tell when baby is finished  [x] Supplementation  [x] Paced bottle feeding  [] Spoon/cup feeding  [x] LPI feeding patterns  [] LBW feeding patterns  [] Expected  output  [x]  weight loss  [x] Infant Feeding Log  [x] Overall goals/How to know baby is getting enough  [] Calming techniques  [] Engorgement/Reverse Pressure Softening  [x] Pumping  [] Breastpump education  [x] Inpatient breastfeeding support  [] Outpatient lactation resources    Follow up: Will follow up tomorrow.

## 2024-12-14 NOTE — LACTATION NOTE
Arielle stopped by and spoke to Tessa I did not see a feeding, we reviewed the  following-      Babies born early and/or low birth weight present unique challenges when it comes to feeding and require a proactive approach. They tend to be sleepier than normal, have less energy levels and fat reserves. This can make it difficult to wake for feeds and maintain a deep latch at the breast. Therefore, most times to support your newborns nutritional needs they will need to start a supplement and continue to supplement until your milk increases and they are able to transfer what they need from the breast.     Babies appear to be doing well baby B is currently doing blood sugar checks and at this time have been all WNL.      LC will see family Saturday.   Please have mom start pumping if infant do require supplement over night- to promote mom's milk supply.

## 2024-12-14 NOTE — PLAN OF CARE
Goal Outcome Evaluation:      Plan of Care Reviewed With: patient    Overall Patient Progress: improvingOverall Patient Progress: improving    Outcome Evaluation: Patients vital signs stable. Patient has butts catheter in place. SCDs on. Butts to be removed on day shift. Patient has very swollen labia. Mag packs, and ice applied to labia. Patient rates pain 4-6/10 this shift. Patient in perineum and uterine cramping. Patient given scheduled Tylenol and Toradol. Patient breastfeeding infants well and sleeping between cares.

## 2024-12-14 NOTE — PROGRESS NOTES
Postpartum Note, Vaginal Delivery/PPD1 twins    Patient Name:  Tessa Aly  :      1990  MRN:      [id number]    Subjective:    The patient feels well, but tired.  Funes just being removed, lochia normal, tolerating normal diet.  Mood normal.    Objective:    Vitals:    24 2346 24 0253 24 0344 24 0809   BP: 119/69 125/73 129/84 108/71   BP Location: Left arm Right arm Right arm Left arm   Patient Position: Semi-Amezcua's  Semi-Amezcua's    Cuff Size: Adult Regular  Adult Regular    Pulse:  68 58 64   Resp: 16 16 16 18   Temp: 97.9  F (36.6  C) 97.8  F (36.6  C) 97.8  F (36.6  C) 97.3  F (36.3  C)   TempSrc: Oral Oral Oral Oral   SpO2: 96% 97% 96% 98%   Weight:       Height:           Abdomen:  Soft, nontender, uterine fundus is firm.  Extremities: no edema bilaterally, non-tender    Urinary output is adequate.     Recent Results (from the past 24 hours)   Hemoglobin    Collection Time: 24  2:43 PM   Result Value Ref Range    Hemoglobin 11.9 11.7 - 15.7 g/dL   Hemoglobin    Collection Time: 24  7:08 AM   Result Value Ref Range    Hemoglobin 10.4 (L) 11.7 - 15.7 g/dL       Assessment:  Normal postpartum course, twin pregnancy    Plan:  Continue present management.  No new changes.  Anticipate discharge tomorrow.      Date:  24  Time:  11:59 AM

## 2024-12-15 ENCOUNTER — MEDICAL CORRESPONDENCE (OUTPATIENT)
Dept: HEALTH INFORMATION MANAGEMENT | Facility: CLINIC | Age: 34
End: 2024-12-15
Payer: COMMERCIAL

## 2024-12-15 VITALS
TEMPERATURE: 98.5 F | SYSTOLIC BLOOD PRESSURE: 117 MMHG | HEART RATE: 60 BPM | HEIGHT: 62 IN | BODY MASS INDEX: 27.1 KG/M2 | OXYGEN SATURATION: 97 % | DIASTOLIC BLOOD PRESSURE: 65 MMHG | WEIGHT: 147.3 LBS | RESPIRATION RATE: 16 BRPM

## 2024-12-15 PROCEDURE — 250N000013 HC RX MED GY IP 250 OP 250 PS 637: Performed by: OBSTETRICS & GYNECOLOGY

## 2024-12-15 RX ADMIN — IBUPROFEN 800 MG: 800 TABLET ORAL at 16:42

## 2024-12-15 RX ADMIN — ACETAMINOPHEN 650 MG: 325 TABLET ORAL at 03:48

## 2024-12-15 RX ADMIN — IBUPROFEN 800 MG: 800 TABLET ORAL at 09:40

## 2024-12-15 RX ADMIN — IBUPROFEN 800 MG: 800 TABLET ORAL at 03:48

## 2024-12-15 RX ADMIN — ACETAMINOPHEN 650 MG: 325 TABLET ORAL at 12:57

## 2024-12-15 RX ADMIN — ACETAMINOPHEN 650 MG: 325 TABLET ORAL at 16:42

## 2024-12-15 RX ADMIN — ACETAMINOPHEN 650 MG: 325 TABLET ORAL at 07:51

## 2024-12-15 ASSESSMENT — ACTIVITIES OF DAILY LIVING (ADL)
ADLS_ACUITY_SCORE: 26
ADLS_ACUITY_SCORE: 29
ADLS_ACUITY_SCORE: 29
ADLS_ACUITY_SCORE: 26
ADLS_ACUITY_SCORE: 29
ADLS_ACUITY_SCORE: 26
ADLS_ACUITY_SCORE: 26
ADLS_ACUITY_SCORE: 29
ADLS_ACUITY_SCORE: 26
ADLS_ACUITY_SCORE: 29
ADLS_ACUITY_SCORE: 26

## 2024-12-15 NOTE — PLAN OF CARE
Goal Outcome Evaluation: Met      Plan of Care Reviewed With: patient, spouse    Overall Patient Progress: improvingOverall Patient Progress: improving    Outcome Evaluation: Tessa's VSS.  She's ambulating and voiding without difficulty; independent with self and baby cares.  Tessa rates her pain 2-4; she has c/o HA pain, perineal pain and uterine cramping.  She states she's had a HA since she got to her PP room, but last time she was asked about it, she said it was gone.  Her L patellar reflex is dimished and Tessa reports that her L leg feels numb between her ankle and knee; no problem with ambulating.  Breastfeeding is going well.  Both babies usually latch well at breast, swallows heard for both.  She may need reminders to enusre that their latches are deep enough.  Lactation saw her today.  Francie VS and assessments remain WDL.  She's independent with self and baby cares.  Her FFU/1, light lochia, no clots.  Her labia are still slight swollen and bruised.  She continues to breastfeed both babies; both have good latch, suck and swallow, and her nipples are intact and comfortable.  She's also pumping and supplementing with donor milk after each breastfeeding due to babys' weight loss.  She will follow up in clinic in 6 weeks.  D:  Patient desires discharge home.  Discharge orders received and entered by provider.  A:  Discharge instructions reviewed with the patient.  All questions and concerns addressed.  R:  Discharge criteria met.  4 Part ID bands double checked.  [place] discharged in car seat with parents.  The nursing assistant escorted patient to car .     Problem: Breastfeeding  Goal: Effective Breastfeeding  Outcome: Met  Intervention: Promote Breast Care and Comfort  Recent Flowsheet Documentation  Taken 12/15/2024 1642 by Sada Wright, RN  Breast Care: Breastfeeding:   open to air   supportive bra utilized  Breast Care: double electric breast pump utilized  Intervention: Promote Effective  Breastfeeding  Recent Flowsheet Documentation  Taken 12/15/2024 1642 by Sada Wright RN  Breastfeeding Assistance:   support offered   electric breast pump used  Parent-Child Attachment Promotion:   caring behavior modeled   cue recognition promoted   face-to-face positioning promoted   interaction encouraged   positive reinforcement provided  Intervention: Support Exclusive Breastfeeding Success  Recent Flowsheet Documentation  Taken 12/15/2024 1642 by Sada Wright RN  Supportive Measures:   active listening utilized   counseling provided   decision-making supported   positive reinforcement provided     Problem: Postpartum (Vaginal Delivery)  Goal: Successful Parent Role Transition  Intervention: Support Parent Role Transition  Recent Flowsheet Documentation  Taken 12/15/2024 1642 by Sada Wright RN  Supportive Measures:   active listening utilized   counseling provided   decision-making supported   positive reinforcement provided  Parent-Child Attachment Promotion:   caring behavior modeled   cue recognition promoted   face-to-face positioning promoted   interaction encouraged   positive reinforcement provided  Goal: Hemostasis  Outcome: Met  Goal: Absence of Infection Signs and Symptoms  Intervention: Prevent or Manage Infection  Recent Flowsheet Documentation  Taken 12/15/2024 1642 by Sada Wright RN  Infection Management: aseptic technique maintained  Goal: Optimal Pain Control and Function  Outcome: Met  Intervention: Prevent or Manage Pain  Recent Flowsheet Documentation  Taken 12/15/2024 1642 by Sada Wright RN  Pain Management Interventions: medication (see MAR)  Perineal Care:   absorbent brief/pad changed   perineal spray bottle/warm water use encouraged   perineal hygiene encouraged   medicated pads applied  Goal: Effective Urinary Elimination  Intervention: Monitor and Manage Urinary Retention  Recent Flowsheet Documentation  Taken 12/15/2024 1642 by Sada Wright RN  Urinary Elimination Promotion:  frequent voiding encouraged

## 2024-12-15 NOTE — PROGRESS NOTES
Progress Note    Doing well. Reports numbness in her left leg. States she has good strength. Voiding. Ambulating. Passing flatus. Normal lochia.     Temp:  [97.8  F (36.6  C)-98.7  F (37.1  C)] 97.8  F (36.6  C)  Pulse:  [60-70] 60  Resp:  [16-18] 16  BP: (116-138)/(69-83) 116/69  SpO2:  [96 %-98 %] 96 %    NAD, AAO x 3  Abdomen soft, non tender  Uterus firm, below the umbilicus  Mild numbness of left knee and shin  No c/c/e    Hgb: 12.3 --> 11.9 --> 10.4    A/P: 33 yo PPD #2 s/p VAVD x 2 doing well  -- Asymptomatic acute blood loss anemia  -- Numbness in leg - s/p anesthesia evaluation. Likely stretch injury with pushing. Discussed observation vs. Neurology consult. Desires discharge home. If does not resolve by Monday will call the office.    -- Routine post partum care  -- Discharge home    Peyton Brady MD, FACOG  P) [phone]

## 2024-12-15 NOTE — PROGRESS NOTES
Asked to see patient, a 34 year old  female who delivered twins vaginally at 37w3d on 24.  Pt had a labor epidural that functioned well after an uneventful easy placement.  The neuraxial block has fully resolved with no apparent weakness or difficulty with ambulation.  On examination, the patient does have decreased sensation in the saphenous and peroneal areas of the left lower extremity, normal sensation in the right lower extremity and full strength in both lower extremities.  There is no apparent dermatomal distribution indicative of any prolonged epidural effect, any evidence of spinal cord compression or nerve root irritation.  Peripheral nerve compression and/or stretch injury during vaginal delivery etiology seems most likely.  Pt desires to go home.  I would recommend further neurologic evaluation and studies as indicated if the decreased sensation persists.

## 2024-12-15 NOTE — PLAN OF CARE
Goal Outcome Evaluation: Progressing      Plan of Care Reviewed With: patient, spouse    Overall Patient Progress: improvingOverall Patient Progress: improving    Outcome Evaluation: Tessa's VSS.  She's ambulating and voiding without difficulty; independent with self and baby cares.  Tsesa rates her pain 2-4; she has c/o HA pain, perineal pain and uterine cramping.  She states she's had a HA since she got to her PP room, but last time she was asked about it, she said it was gone.  Her L patellar reflex is dimished and Tessa reports that her L leg feels numb between her ankle and knee; no problem with ambulating.  Breastfeeding is going well.  Both babies usually latch well at breast, swallows heard for both.  She may need reminders to enusre that their latches are deep enough.  Lactation saw her today.    Problem: Breastfeeding  Goal: Effective Breastfeeding  Intervention: Support Exclusive Breastfeeding Success  Recent Flowsheet Documentation  Taken 12/14/2024 1715 by Sada Wright, RN  Supportive Measures:   active listening utilized   decision-making supported   positive reinforcement provided   self-care encouraged     Problem: Postpartum (Vaginal Delivery)  Goal: Successful Parent Role Transition  Outcome: Met  Intervention: Support Parent Role Transition  Recent Flowsheet Documentation  Taken 12/14/2024 1715 by Sada Wright, RN  Supportive Measures:   active listening utilized   decision-making supported   positive reinforcement provided   self-care encouraged  Parent-Child Attachment Promotion:   caring behavior modeled   cue recognition promoted   face-to-face positioning promoted   interaction encouraged   participation in care promoted   positive reinforcement provided   skin-to-skin contact encouraged     Problem: Postpartum (Vaginal Delivery)  Goal: Effective Urinary Elimination  Outcome: Met

## 2024-12-15 NOTE — LACTATION NOTE
Follow up Lactation Visit    Current age : 43 hours old    Gestational age at delivery: 37w3d     Diaper count: A: 1 wet 4 soiled meconium color   B: 5 wet 4 soiled meconium color    Feedings: A: 9 breast 3 supplement 15-20ml  B: 9 breast 4 supplement 10-15ml     Weight Loss:A: 7.45% at 36 hours  B:-8.61% at 36 hours    Breastfeeding goals:undecided    Home Pump: Unimom opera, encouraged rental of HGP. Prescription given and instructed to take pump kit home. They may still decide to rent before discharging.     Breast assessment: Breast: Round and Soft , Nipple:Everted and Intact    Infant oral assessment: A & BMidline and Round, Strong and Coordinated    Feeding assessment: A: Infant woken by LC. Infant has been fussy when attempted to latch. He does not open very wide and needed some assisted to have good head flexion. He had multiple attempts to achieve a deep latch but was able to. Infant needs stimulation to continue sucking. Intermittent swallows with long pauses. Feeding ended after 8 minutes. LC assisted with bottle feeding. Infant needs some chin support to keep up energy. Emphasized if infant is too fatigued to take bottle to only offer breast every other time or limit time to 5 minutes.     B: Infant awake with feeding cues. Infant latches easily in football hold with a wide gape. Rhythmic sucking pattern throughout the short and bursts of 1-3:1. Swallows increase with breast compression. Infant needs stimulation to continue being vigorous. Feeding ended at 10 minutes when baby showed signs of fatigue.     Feeding plan: Breastfeeding Care Plan for Late /Early Term/Low Birth Weight Baby     Babies born early and/or low birth weight present unique challenges when it comes to feeding and require a proactive approach. They tend to be sleepier than normal, have less energy levels and fat reserves. This can make it difficult to wake for feeds and maintain a deep latch at the breast. Therefore, most times  to support your newborns nutritional needs they will need to start a supplement and continue to supplement until your milk increases and they are able to transfer what they need from the breast.     Feed every 2-3 hours. Keep breastfeeding efficient. If infant does not latch within 5-10 minutes, or infant sleepy at breast, or not transferring milk then end feeding at breast.   Positioning reminders:  line up baby's nose to nipple   ear, shoulder, hip, nice straight line   chin off bay's chest; chin touching your breast prior to latch  your thumb lined up like baby's mustache, fingers under breast like a baby's beard  cheeks touching breast  Signs of milk transfer: hearing swallows, comfortable latch, meeting output goals and softening of breasts.   Supplement baby after every breastfeeding with colostrum/breastmilk ( If colostrum/breastmilk is not available, then donor milk or formula may be used) Use below as a guideline; give more as baby cues.    0-48 hours 5-15 ml each feeding  48-72 hours 15-30 ml each feeding  72-96 hours 30-60 ml each feeding  96+ hours        and older follow healthcare providers recommendation    Pump for 15-20 minutes   Follow up with your healthcare provider as recommended and lactation consultant within 2-3 days after discharge.          Education:   [x] Expected  feeding patterns in the first few days (pg. 38 of Your Guide to To Postpartum and [facility])/ the Second Night  [x] Stages of milk production  [] Hand expression   [] Feeding cues     [x] Benefits of skin to skin  [x] Breastfeeding positions  [x] Tips to get and maintain a deep latch  [] Usage of nipple shield  [x] Nutritive vs.non-nutritive sucking  [x] Gentle breast compressions as needed  [x] How to tell when baby is finished  [x] Supplementation  [x] Paced bottle feeding  [] Spoon/cup feeding  [x] LPI feeding patterns  [] LBW feeding patterns  [x] Expected  output  [x]  weight loss  [x] Overall  goals/How to know baby is getting enough  [] Infant Feeding Log  [] Calming techniques  [x] Engorgement/Reverse Pressure Softening  [x] Pumping  [x] Breastpump education  [x] Inpatient breastfeeding support  [x] Outpatient lactation resources    Follow up: Will follow up with outpatient lactation and recommended weight check tomorrow.

## 2024-12-15 NOTE — DISCHARGE INSTRUCTIONS
Warning Signs after Having a Baby    Keep this paper on your fridge or somewhere else where you can see it.    Call your provider if you have any of these symptoms up to 12 weeks after having your baby.    Thoughts of hurting yourself or your baby  Pain in your chest or trouble breathing  Severe headache not helped by pain medicine  Eyesight concerns (blurry vision, seeing spots or flashes of light, other changes to eyesight)  Fainting, shaking or other signs of a seizure    Call 9-1-1 if you feel that it is an emergency.     The symptoms below can happen to anyone after giving birth. They can be very serious. Call your provider if you have any of these warning signs.    My provider s phone number: _______________________    Losing too much blood (hemorrhage)    Call your provider if you soak through a pad in less than an hour or pass blood clots bigger than a golf ball. These may be signs that you are bleeding too much.    Blood clots in the legs or lungs    After you give birth, your body naturally clots its blood to help prevent blood loss. Sometimes this increased clotting can happen in other areas of the body, like the legs or lungs. This can block your blood flow and be very dangerous.     Call your provider if you:  Have a red, swollen spot on the back of your leg that is warm or painful when you touch it.   Are coughing up blood.     Infection    Call your provider if you have any of these symptoms:  Fever of 100.4 F (38 C) or higher.  Pain or redness around your stitches if you had an incision.   Any yellow, white, or green fluid coming from places where you had stitches or surgery.    Mood Problems (postpartum depression)    Many people feel sad or have mood changes after having a baby. But for some people, these mood swings are worse.     Call your provider right away if you feel so anxious or nervous that you can't care for yourself or your baby.    Preeclampsia (high blood pressure)    Even if you  didn't have high blood pressure when you were pregnant, you are at risk for the high blood pressure disease called preeclampsia. This risk can last up to 12 weeks after giving birth.     Call your provider if you have:   Pain on your right side under your rib cage  Sudden swelling in the hands and face    Remember: You know your body. If something doesn't feel right, get medical help.     For informational purposes only. Not to replace the advice of your health care provider. Copyright 2020 [facility]. All rights reserved. Clinically reviewed by Sammie Sanders, RNC-OB, MSN. [employer] [id number] - Rev .    Breastfeeding Care Plan for Late /Early Term/Low Birth Weight Baby     Babies born early and/or low birth weight present unique challenges when it comes to feeding and require a proactive approach. They tend to be sleepier than normal, have less energy levels and fat reserves. This can make it difficult to wake for feeds and maintain a deep latch at the breast. Therefore, most times to support your newborns nutritional needs they will need to start a supplement and continue to supplement until your milk increases and they are able to transfer what they need from the breast.     Feed every 2-3 hours. Keep breastfeeding efficient. If infant does not latch within 5-10 minutes, or infant sleepy at breast, or not transferring milk then end feeding at breast.   Positioning reminders:  line up baby's nose to nipple   ear, shoulder, hip, nice straight line   chin off bay's chest; chin touching your breast prior to latch  your thumb lined up like baby's mustache, fingers under breast like a baby's beard  cheeks touching breast  Signs of milk transfer: hearing swallows, comfortable latch, meeting output goals and softening of breasts.   Supplement baby after every breastfeeding with colostrum/breastmilk ( If colostrum/breastmilk is not available, then donor milk or formula may be used) Use below as a  guideline; give more as baby cues.    0-48 hours 5-15 ml each feeding  48-72 hours 15-30 ml each feeding  72-96 hours 30-60 ml each feeding  96+ hours        and older follow healthcare providers recommendation    Pump for 15-20 minutes   It is okay to do speed rounds of focusing on pumping and bottling to maximize your rest if needed  Follow up with your healthcare provider as recommended and lactation consultant within 2-3 days after discharge.    Tips for Twins  - One baby takes the breast every other feeding, pump and supplement both babies  - Speed rounds: Pump and partner/family bottle feed babies    Engorgement     Before breastfeeding or pumping:  Soften breast tissue by applying a warm damp compress, shower, bathtub and/or dangle breasts in bowl of warm water for 2-5 minutes before breastfeeding.   Soften areola using reverse pressure softening. Place your fingers on either side of the nipple. Push gently but firmly straight inward toward your ribs. Hold the pressure steady for 30-60 seconds.  Repeat with your fingers above and below the nipple. (See illustration below.) Goal is for your areola to be soft like room temperature butter.  Breast lift: Lay on your back and lift and hold your breast upwards with both hands for 1-2 minutes until areola is soft.   Breast gymnastics: Start by stoking breast from nipple to armpit x10 (pressure like petting a cat), stroke any firm areas towards armpit x10. Hold breast and move slowly and gently in circles in both directions. This helps to stretch milk ducts and prevent blocked ducts.                      After breastfeeding:  Ice packs on breasts for up to 20 minutes as needed.  Talk to your healthcare provider about anti-inflammatory medications like ibuprofen.  If still uncomfortably full, pump, stopping when breasts are more comfortable.                                                                        Assessment of Breastfeeding after discharge: Is baby  getting enough to eat?    If you answer YES to all these questions by day 5, you will know breastfeeding is going well.    If you answer NO to any of these questions, call your baby's medical provider or Outpatient Lactation at [phone].  Refer to the Postpartum and  Care Book(PNC), starting on page 35. (This is the booklet you tracked baby's feedings and diaper counts while in the hospital.)   Please call Outpatient Lactation at [phone] with breastfeeding questions or concerns.    1.  My milk came in (breasts became zimmerman on day 3-5 after birth).  I am softening the areola using hand expression or reverse pressure softening prior to latch, as needed.  YES NO   2.  My baby breastfeeds at least 8 times in 24 hours. YES NO   3.  My baby usually gives feeding cues (answer  No  if your baby is sleepy and you need to wake baby for most feedings).  *PNC page 36   YES NO   4.  My baby latches on my breast easily.  *PNC page 37  YES NO   5.  During breastfeeding, I hear my baby frequently swallowing, (one-two sucks per swallow).  YES NO   6.  I allow my baby to drain the first breast before I offer the other side.   YES NO   7.  My baby is satisfied after breastfeeding.   *PNC page 39 YES NO   8.  My breasts feel zimmerman before feedings and softer after feedings. YES NO   9.  My breasts and nipples are comfortable.  I have no engorgement or cracked nipples.    *PNC Page 40 and 41  YES NO   10.  My baby is meeting the wet diaper goals each day.  *PNC page 38  YES NO   11.  My baby is meeting the soiled diaper goals each day. *PNC page 38 YES NO   12.  My baby is only getting my breast milk, no formula. YES NO   13. I know my baby needs to be back to birth weight by day 14.  YES NO   14. I know my baby will cluster feed and have growth spurts. *PNC page 39  YES NO   15.  I feel confident in breastfeeding.  If not, I know where to get support. YES NO     Other  "resources:  www.My[employer]Screen  www.[employer].ca-Breastfeeding Videos  www.[employer]Current[employer].org--Our videos-Breastfeeding  YouTube short video \"[employer] Hold/ Asymmetric Latch \" Breastfeeding Education by [employer].         Pumping Plan    Goal is to pump for 15-20 minutes 8-10 times in 24 hours. (During the daytime pump every 2-3 hours and overnight every 3-4 hours)   Pump your breasts until milk stops dripping and breasts are soft. A soft and well drained breast supports milk supply.   Pumping logs can be helpful in staying on a schedule.  Pumping bra or band can allow you to be able to gently massage breasts while you pump to maximize milk removal.  Turn suction up until a deep tug is felt.  Pumping should not cause pain, if so...   -Turn down suction level   -Use nipple cream before and after pumping   -Check nipple placement in flange    Contact a lactation consultant for further guidance and support.     [employer]:                Getting to Know your Unimom Opera Breast Pump      The unimom pump has settings for customizing your pumping. You can pump with both breasts stimulated at once (synchronous) or pump alternating between each breast (alternate). In both of these modes you pump both breasts for 15-25 minutes. Unimom recommends using the \"alternate\" mode.     Here is an example of pumping with the Unimom Opera:  Cycle  (Massage mode) for 2-3 minutes or until milk starts to flow.   Cycle 42 (Expression mode) for 10 minutes of longer or until milk slows.  Cycle 26 (Expression mode) for 2-3 minutes or longer until milk slows and breast feels soft and well drained  In all cycles, increase vacuum until you are feeling deep tugging and no pain.     Here is an example of cycle pumping with the Unimom Opera:  Cycle 100 (Massage mode) for 5 minutes or until milk starts to flow  Cycle 42 (Expression mode) for 5 minutes or until milk slows  Cycle 60-80 (Massage mode) for 5 minutes or until milk starts to " flow  Cycles 26 (Expression mode) for 5 minutes or longer until milk slows and breast feels soft and well drained  In all cycles, increase vacuum until you are feeling deep tugging and no pain.

## 2024-12-15 NOTE — LACTATION NOTE
Patient has elected to rent the hospital grade electric breast pump. Verbal order obtained from Dr Brady and placed on chart. Instructions for use are on discharge information. Specifics about settings for initiate and maintain mature milk will be discussed by Angelique Jones IBCLC before discharge

## 2024-12-15 NOTE — PLAN OF CARE
Goal Outcome Evaluation:      Plan of Care Reviewed With: patient    Overall Patient Progress: improvingOverall Patient Progress: improving    Outcome Evaluation: Patient up independently. Patient continues to have vaginal swelling. Mag packs applied. Patient administed Tylenol and Ibuprofen for perineal and cramping pain. Patient breastfeeding both infants and sleeping between cares.  very attentive to patient and infants.

## 2024-12-15 NOTE — PLAN OF CARE
Goal Outcome Evaluation:      Plan of Care Reviewed With: patient    Overall Patient Progress: improvingOverall Patient Progress: improving    Outcome Evaluation: vss, hgb 10.4, up ad maria teresa  in room. has voided 150 ml since butts came out at noon.    Pt requested a visit from anesthesia to evaluate left leg residual numbness with decrease of patellar reflex. Anesthesia call [phone] notified of pt status and will see today prior to discharge.

## 2024-12-15 NOTE — DISCHARGE SUMMARY
HOSPITAL DISCHARGE SUMMARY - VAGINAL BIRTH    Patient Name: Tessa Aly   YOB: 1990  Age: 34 year old  Medical Record Number: [id number]  Primary Physician: Anya Sutherland    Admission Date:  2024  Delivery Date:  2024  Gestational Age at Delivery:  37w 3d   Discharge Date:  12/15/2024    REASON FOR ADMISSION: Labor and Delivery    DIAGNOSIS:    1. IUP  34 year old  female delivery at 37w3d   2. Twin A - APGARS at 1 min 9, at 5 min 9, Twin B 9/9.   3. Baby's Weight - Twin A 6 IBS 6.3 oz, Twin B 5 IBS 8.5 oz    PROCEDURE:  VAVD x 2    CONDITIONS COMPLICATING ANTEPARTUM/POSTPARTUM:   Antepartum - Di/Di Twins, IVF, AMA, gestational thrombocytopenia, migraines and mild pre eclampsia.   Intrapartum - Cat II tracing  Postpartum - Asymptomatic acute blood loss anemia. Suspected stretch injury of common peroneal nerve.     SIGNIFICANT DIAGNOSTIC PROCEDURES:   None    CONSULTS:   None    HISTORY OF PRESENT ILLNESS AND HOSPITAL COURSE: This is a 34 year old  female who presented to L+D for blood pressure evaluation.  She had mildly elevated blood pressures and proteinuria. She met diagnosis of mild preeclampsia.  She was induced with Pitocin.  AROM was performed.  She progressed to complete.  She underwent a vacuum-assisted vaginal delivery x 2.  She did have a postpartum hemorrhage that was treated with rectal Cytotec, TXA, Hemabate x 1 and a Sakshi.  She had asymptomatic acute blood loss anemia.  She did well postpartum. On the day that she was discharged, vital signs were stable. Her pain was controlled, she was tolerating diet. She was voiding and passing gas.     LABS:  Hemoglobin   Date Value Ref Range Status   2024 10.4 (L) 11.7 - 15.7 g/dL Final   2024 11.9 11.7 - 15.7 g/dL Final      PENDING LABS:  None    DISPOSITION:  Home    CONDITION AT DISCHARGE:  Good/Stable    Discharge Medications:      Review of your medicines        UNREVIEWED medicines. Ask your  doctor about these medicines        Dose / Directions   eletriptan 20 MG tablet  Commonly known as: RELPAX  Used for: Chronic migraine without aura without status migrainosus, not intractable      Dose: 20 mg  Take 1 tablet (20 mg) by mouth at onset of headache for migraine (repeat in 2 hours if needed) May repeat in 2 hours. Max 2 tablets/24 hours.  Quantity: 18 tablet  Refills: 11     galcanezumab-gnlm 120 MG/ML injection  Commonly known as: EMGALITY  Used for: Chronic migraine without aura without status migrainosus, not intractable      Dose: 120 mg  Inject 1 mL (120 mg) Subcutaneous every 28 days  Quantity: 1 mL  Refills: 11     metoclopramide 10 MG tablet  Commonly known as: REGLAN  Used for: Chronic migraine without aura without status migrainosus, not intractable      Dose: 10 mg  Take 1 tablet (10 mg) by mouth 3 times daily as needed (migraine)  Quantity: 20 tablet  Refills: 11     rizatriptan 10 MG tablet  Commonly known as: MAXALT  Used for: Chronic migraine without aura without status migrainosus, not intractable      Dose: 10 mg  Take 1 tablet (10 mg) by mouth at onset of headache for migraine (repeat in 2 hours if needed)  Quantity: 18 tablet  Refills: 11            CONTINUE these medicines which have NOT CHANGED        Dose / Directions   aspirin 81 MG EC tablet      Dose: 81 mg  Take 81 mg by mouth daily.  Refills: 0     cholecalciferol 10 mcg (400 units) Tabs tablet  Commonly known as: VITAMIN D3      Dose: 10 mcg  Take 10 mcg by mouth daily  Refills: 0     ondansetron 4 MG ODT tab  Commonly known as: ZOFRAN ODT  Used for: Ovarian hyperstimulation syndrome      Dose: 4 mg  Take 1 tablet (4 mg) by mouth every 8 hours as needed for nausea  Quantity: 30 tablet  Refills: 0     prenatal multivitamin  plus iron 27-1 MG Tabs      Take by mouth daily  Refills: 0            STOP taking      oxyCODONE 5 MG tablet  Commonly known as: ROXICODONE                DISCHARGE PLAN:   - Follow up with myself, in 4-6  weeks. Call clinic Monday if numbness in left knee has not resolved.   - Take medication as prescribed  - Physical activity: As tolerated, no heavy lifting. Pelvic rest.  - Diet:  Regular  - Medication:  Please see MAR  - Warning signs discussed with patient about when to call the clinic/hospital  - All questions and concerns were answered for the patient prior to discharge.     Peyton Brady MD, FACOG  P) [phone]    I saw the patient on the date of discharge  Total time spent for discharge on date of discharge: 20 minutes

## 2024-12-17 LAB
PATH REPORT.COMMENTS IMP SPEC: NORMAL
PATH REPORT.COMMENTS IMP SPEC: NORMAL
PATH REPORT.FINAL DX SPEC: NORMAL
PATH REPORT.GROSS SPEC: NORMAL
PATH REPORT.MICROSCOPIC SPEC OTHER STN: NORMAL
PATH REPORT.RELEVANT HX SPEC: NORMAL
PHOTO IMAGE: NORMAL

## 2024-12-24 ENCOUNTER — DOCUMENTATION ONLY (OUTPATIENT)
Dept: OBGYN | Facility: HOSPITAL | Age: 34
End: 2024-12-24
Payer: COMMERCIAL

## 2024-12-31 ENCOUNTER — MEDICAL CORRESPONDENCE (OUTPATIENT)
Dept: HEALTH INFORMATION MANAGEMENT | Facility: CLINIC | Age: 34
End: 2024-12-31
Payer: COMMERCIAL

## 2024-12-31 ENCOUNTER — DOCUMENTATION ONLY (OUTPATIENT)
Dept: PEDIATRICS | Facility: CLINIC | Age: 34
End: 2024-12-31
Payer: COMMERCIAL

## 2025-01-03 NOTE — PROGRESS NOTES
"[facility] Lactation Visit    Assessment:     difficulty in feeding at breast    [place] infant of 37 completed weeks of gestation    Liveborn infant, of twin pregnancy, born in hospital by vaginal delivery     Jeff Aly is a 2 week old old male infant born at 37 weeks and 3 days via vacuum vaginal delivery on 2024 here for lactation support.    Jeff Aly is doing well. Jeff Aly has gained 25 oz since their last visit 16 days ago; approximately 1.5 oz per day.  This is adequate weight gain at this age. Main concern is ensuring infant is transferring adequately from the breast. Infant was able to tandem nurse with twin sister today. Infant was able to transfer 3.6 oz from the breast which is adequate milk transfer at this age. Recommended to continue with tandem nursing and supplement/pump only as needed. Please see feeding plan below.    Plan:    Feeding plan: Breast-feed based on infant cues; at least 8-12 times a day. When dual nursing, alternate sides each feeding. When latching Jeff Aly, make sure head, neck, and body are aligned an facing you. Support breast with \"sandwich\" hold or \"C\" hold while infant is breast-feeding. To obtain a deeper latch, aim the tip of the nipple to infant's roof of the mouth (aim for the nose). Ensure lips are flanged out. If having difficulty, wait for wide gape and gently apply downward pressure to the infant's chin. If latch is painful or lips are pursed in, unlatch Jeff Aly and reposition. Make sure to stimulate Jeff Aly to actively nurse. Listen for swallows. If swallows are less frequent, stimulate infant by tickling his hands or feet. You may also wipe a cool wash cloth on his face or hand express your breast for milk. Also skin-to-skin and undressing Jeff Aly down to her diaper can be helpful. Burp Jeff Aly before switching sides and burp again after breast-feeding. Keep Jeff Aly in upright " position for about 10-15 minutes after feeding, before laying him flat on his back.    A typical feeding is 10-15 minutes on each side; total of 20-30 minutes per breast-feeding session.    Supplementation: A typical feeding volume at this age is about 2-3 oz per feeding. Jeff was able to transfer 3.6 oz from the breast. Supplement only as needed.      Pumping plan:  Pump only as needed for relief from engorgement or if breasts still feel full after nursing     Continue to monitor output, expect at least 6 wet diapers per day and 2-4 stools in a day. If Jeff Aly has less than the recommended wet diapers, please call us.     Jeff Aly should start Vitamin D 400 international units, once daily.    Follow up with your primary care provider in 2 weeks or sooner if there are any concerns or previously recommended visits by primary care provider.    Maternal nipple care:   Best way to help decrease nipple soreness is to obtain a deep latch. When you pump, nipples should not touch the sides of the flange. Apply lanolin or coconut oil after breast-feeding or pumping. Wipe away left over residue before next breast-feeding or pumping. Allow nipples to air dry as much as possible to help stimulate healing. If mother is experiencing persistent breast pain, flu-like symptoms, localized breast tenderness/redness/warmness, or fevers, please contact mother's primary care provider or Obstetrician/midwife for further evaluation.    Return to clinic sooner or call clinic sooner for any worsening symptoms (inconsolability, fever greater than 100.4F, less wet diapers, no stools, sleepiness, difficulty feeding, abdominal distention).    For further lactation concerns, please call [phone].    ____________________________________________________________________  SUBJECTIVE:     Jeff Aly is a 2 week old old male infant born at Gestational Age: 37w3d via Vaginal, Vacuum (Extractor) delivery on [birth year]/13/2024 at 1:15 PM  here for lactation support. This patient is accompanied in the office by his mother and father.     Concerns: difficult to latch. Falls asleep easily at the breast.   Baby is nursing every 6 hours for about 30 minutes per session. Alternating with formula and breast-feeding. Takes about 3-4 oz with formula every 6 hours.   Mother reports hearing audible swallows.   Nurses at least 3-4 times a day. Wakes up on her own for feeds.   Baby is supplemented with breastmilk, about 2 oz after feeds (approximately 2 times per day).   Baby has about 6 wet diapers and 1 stool every 3rd day. Soft stools Stools are brown/green in color.     Mom is also pumping about 1 every 2 days and gets about 5-6 oz per pumping session. Mom noticed her breasts grew larger and areolas darkened during pregnancy and she noticed primary engorgement when her milk came in on day 5-6.     Breastfeeding Goals: would like to breast feed exclusively. Open to bottle-feeding if needed.     Previous Breastfeeding Experience:  nursed older child for 6 months     Breast-surgery: none.     Maternal medications: none.  Infant medications: none       Patient Active Problem List    Diagnosis Date Noted    Term birth of male  [birth year]/15/2024     Priority: Medium     No results found for any visits on 24.  No current outpatient medications on file.  No past medical history on file.  No past surgical history on file.  No family history on file.    Primary care provider: Glory Cabrera    OBJECTIVE:    Mother:   Nipples are everted, the areola is compressible, the breast is soft and full.     Sore nipples: none   Maternal pain: with initial latch, but improves after a couple of minutes.    Maternal depression screening: Doing well    Infant:   Vitals:    24 0813   Pulse: 156   Resp: 48   Temp: 98.8  F (37.1  C)   Weight: 7 lb 7.7 oz (3.393 kg)        Weight:   Birthweight: 6 lbs 6.29 oz  Today's weight: 7 lbs 7.7 oz    17% from birth  "weight.    Weight after left side feedin lbs 10.7 oz  Weight after right side feedin lbs 11.3 oz  Amount of milk transferred from LEFT side: 3 oz  Amount of milk transferred from RIGHT side: 0.6oz    Total transfer: 3.6 oz    Feeding assessment:     Infant can draw gloved finger into mouth. Infant demonstrated a coordinated suck. Infant palate is intact, tongue over gums, normal frenulum.   Infant can hold suction with tongue while at the breast. Infant did not frequent stimulation at the breast.     Alignment: Infant's head was aligned with its trunk. Infant did face mother. Baby was in football position today. Discussed importance of infant ventral positioning to obtain a deeper latch.     Areolar Grasp: Infant was assisted by gently applying downward pressure to the chin to open mouth wide. Infant's lips were not pursed. Infant's lips did flange outward. Tongue was visible just barely over bottom lip. Infant had complete seal.     Areolar Compression: Infant made rhythmic motion. There were no clicking or smacking sounds. There was no severe nipple discomfort.  Nipples appeared round after feeding.    Audible swallowing: Infant made quiet sounds of swallowing. There was an increase in frequency after milk ejection reflex.    PHYSICAL EXAM    Gen: Alert, no acute distress.   Head: Anterior and posterior fontanelles are flat and soft.   Eyes: No eye drainage. Sclera clear.   Ears: Pinna appear well-formed. No pits.   Nose: nares patent. No nasal congestion. No nasal flaring.  Mouth: Oral mucosa moist. Tongue midline (tongue elevation adequate when crying, good lateralization). Frenulum palpable. No \"heart-shaped\" tongue. Tongue able to extend pass lower gumline. Lips closed at rest.   Neck: Clavicles intact bilaterally.  Lungs: Clear to auscultation bilaterally.   Cardiac: Regular regular rate and rhythm, S1S2, no murmurs. Brachial and femoral pulses +2 and equal.  Abdomen: Soft, nontender, bowel sounds " present, no hepatosplenomegaly or mass palpable. Umbilicus dry with no erythema or drainage.   : Sami stage 1 male genitalia. Circumcision appears to be healing well.  Skin: Intact. Dry. No rash. No jaundice.   Musculoskeletal: equal movements of upper and lower extremities. Neuro: Appropriate muscle tone.    The visit lasted a total of 55 minutes doing history taking, review of chart, latch assessment, discussion of feeding plan and documentation.  Completed by:   Pramod Wilson, APRN, CPNP, IBCLC  [facility]  [facility]  2024, 8:23 AM    [facility] Lactation Visit     Assessment:      difficulty in feeding at breast     [place] infant of 37 completed weeks of gestation     Liveborn infant, of twin pregnancy, born in hospital by vaginal delivery     Melissa Aly is a 2 week old old female infant born at 37 weeks and 3 days via vacuum vaginal delivery on 2024 here for lactation support.     Melissa Aly is doing well. Melissa Aly has gained 25.2 oz since their last visit 16 days ago; approximately 1.5 oz per day.  This is adequate milk transfer at this age. Main concern today is ensuring infant is transferring adequate milk and establishing a sustainable feeding plan with twin brother. Infant was able to transfer 2.9 oz from the breast today. Tandem nursing completed today with twin brother. Both were able to transfer adequately from the breast with minimal maternal nipple discomfort. Recommended supplementation only as needed. Please see feeding plan below.     Parents mentioned that infant has some milk spillage while bottle-feeding at home. Infant is using a size 1 nipple. Recommended pace feeding with the bottle and providing chin support while holding the bottle. No milk spillage noted while nursing.     Plan:     Feeding plan: Breast-feed based on infant cues; at least 8-12 times a day. Alternate sides when dual nursing.  "When latching Melissa Aly, make sure head, neck, and body are aligned an facing you. Support breast with \"sandwich\" hold or \"C\" hold while infant is breast-feeding. To obtain a deeper latch, aim the tip of the nipple to infant's roof of the mouth (aim for the nose). Ensure lips are flanged out. If having difficulty, wait for wide gape and gently apply downward pressure to the infant's chin. If latch is painful or lips are pursed in, unlatch Melissa Aly and reposition. Make sure to stimulate Melissa Aly to actively nurse. Listen for swallows. If swallows are less frequent, stimulate infant by tickling her hands or feet. You may also wipe a cool wash cloth on her face or hand express your breast for milk. Also skin-to-skin and undressing Melissa Aly down to her diaper can be helpful. Burp Melissa Aly before switching sides and burp again after breast-feeding. Keep Melissa Aly in upright position for about 10-15 minutes after feeding, before laying her flat on her back.     A typical feeding is 10-15 minutes on each side; total of 20-30 minutes per breast-feeding session.        Supplementation: Melissa was able to transfer 2.9 oz from the breast. This is adequate milk transfer at this age. Supplement only as needed.     Pumping plan:  Pump only as needed for relief from engorgement or if breasts still feel full after nursing      Continue to monitor output, expect at least 6 wet diapers per day and 2-4 stools in a day. If Jeff Aly has less than the recommended wet diapers, please call us.      Melissa should start Vitamin D 400 international units, once daily.     Follow up with your primary care provider in 2 weeks or sooner if there are any concerns or previously recommended visits by primary care provider.     Maternal nipple care:   Best way to help decrease nipple soreness is to obtain a deep latch. When you pump, nipples should not touch the sides of the flange. Apply lanolin or coconut oil after " breast-feeding or pumping. Wipe away left over residue before next breast-feeding or pumping. Allow nipples to air dry as much as possible to help stimulate healing. If mother is experiencing persistent breast pain, flu-like symptoms, localized breast tenderness/redness/warmness, or fevers, please contact mother's primary care provider or Obstetrician/midwife for further evaluation.     Return to clinic sooner or call clinic sooner for any worsening symptoms (inconsolability, fever greater than 100.4F, less wet diapers, no stools, sleepiness, difficulty feeding, abdominal distention).     For further lactation concerns, please call [phone].     ____________________________________________________________________  SUBJECTIVE:      Melissa Aly is a 2 week old old female infant born at Gestational Age: 37w3d via Vaginal, Vacuum (Extractor) delivery on [birth year]/2024 at 1:31 PM here for lactation support. This patient is accompanied in the office by her mother and father.      Concerns: making sure they are doing well. Falls asleep at the breast.     Baby is nursing every 6 hours for about 30 minutes per session. Alternating with formula and breast-feeding. Takes about 3-4 oz with formula every 6 hours.   Mother reports hearing audible swallows.   Nurses at least 3-4 times a day. Wakes up on her own for feeds.   Baby is supplemented with breastmilk, about 2 oz after feeds (approximately 2 times per day).   Baby has about 6 wet diapers and 1 stool every 3rd day. Soft stools Stools are brown/green in color.     Mom is also pumping about 1 every 2 days and gets about 5-6 oz per pumping session. Mom noticed her breasts grew larger and areolas darkened during pregnancy and she noticed primary engorgement when her milk came in on day 5-6.     Breastfeeding Goals: would like to breast feed exclusively. Open to bottle-feeding if needed.     Previous Breastfeeding Experience:  nursed older child for 6 months      Breast-surgery: none.     Maternal medications: none.  Infant medications: none        There are no active problems to display for this patient.     No results found for any visits on 24.  Current Medications   No current outpatient medications on file.     Past Medical History   No past medical history on file.     Past Surgical History   No past surgical history on file.     Family History   No family history on file.        Primary care provider: Glory Cabrera     OBJECTIVE:     Mother:   Nipples are everted, the areola is compressible, the breast is soft and full.      Sore nipples: none   Maternal pain: with initial latch, but improves after a couple of minutes.     Maternal depression screening: Doing well     Infant:   Vitals       Vitals:     24 0812   Pulse: 160   Resp: 44   Temp: 99  F (37.2  C)   Weight: 6 lb 10.1 oz (3.008 kg)            Weight:   Birthweight: 5 lbs 8.54 oz  Today's weight: 6 lbs 10.1 oz     20% from birth weight.     Weight after left side feedin lbs 13 oz  Weight after right side feedin lbs 12.5 oz  Amount of milk transferred from LEFT side: 0.5 oz  Amount of milk transferred from RIGHT side: 2.4 oz     Total transfer: 2.9 oz     Feeding assessment:      Infant can draw gloved finger into mouth. Infant demonstrated a coordinated suck. Infant palate is intact, tongue over gums, normal frenulum. Infant can hold suction with tongue while at the breast. Infant did not need frequent stimulation at the breast.      Alignment: Infant's head was aligned with its trunk. Infant did face mother. Baby was in football position today. Discussed importance of infant ventral positioning to obtain a deeper latch.      Areolar Grasp: Infant was able to open mouth wide.  Infant's lips were not pursed. Infant's lips did flange outward. Tongue was visible just barely over bottom lip. Infant had complete seal.      Areolar Compression: Infant made rhythmic motion.  "There were no clicking or smacking sounds. There was no severe nipple discomfort.  Nipples appeared round after feeding.     Audible swallowing: Infant made quiet sounds of swallowing. There was an increase in frequency after milk ejection reflex.     PHYSICAL EXAM     Gen: Alert, no acute distress.   Head: Anterior and posterior fontanelles are flat and soft.   Eyes: No eye drainage. Sclera clear. Bilateral red reflexes present.   Ears: Pinna appear well-formed. No pits.   Nose: nares patent. No nasal congestion. No nasal flaring.  Mouth: Oral mucosa moist. Tongue midline (tongue elevation adequate when crying, good lateralization). Frenulum palpable. No \"heart-shaped\" tongue. Tongue able to extend pass lower gumline. Lips closed at rest.   Neck: Clavicles intact bilaterally.  Lungs: Clear to auscultation bilaterally.   Cardiac: Regular regular rate and rhythm, S1S2, no murmurs. Brachial and femoral pulses +2 and equal.  Abdomen: Soft, nontender, bowel sounds present, no hepatosplenomegaly or mass palpable. Umbilicus dry with no erythema or drainage.   : Sami stage 1 female genitalia  Skin: Intact. Dry. No rash. NO jaundice.   Musculoskeletal: equal movements of upper and lower extremities.   Neuro: Appropriate muscle tone.     The visit lasted a total of 55 minutes doing history taking, review of the chart, assessment of latch, discussion of feeding plan and documentation.     Completed by:   Pramod Wilson, APRN, CPNP, IBCLC  [facility]  [facility]  12/31/2024, 8:24 AM    "

## 2025-01-21 ENCOUNTER — LAB REQUISITION (OUTPATIENT)
Dept: LAB | Facility: CLINIC | Age: 35
End: 2025-01-21

## 2025-01-21 DIAGNOSIS — O99.113 OTHER DISEASES OF THE BLOOD AND BLOOD-FORMING ORGANS AND CERTAIN DISORDERS INVOLVING THE IMMUNE MECHANISM COMPLICATING PREGNANCY, THIRD TRIMESTER: ICD-10-CM

## 2025-01-21 LAB
ERYTHROCYTE [DISTWIDTH] IN BLOOD BY AUTOMATED COUNT: 13.5 % (ref 10–15)
HCT VFR BLD AUTO: 41.4 % (ref 35–47)
HGB BLD-MCNC: 13.1 G/DL (ref 11.7–15.7)
MCH RBC QN AUTO: 28.5 PG (ref 26.5–33)
MCHC RBC AUTO-ENTMCNC: 31.6 G/DL (ref 31.5–36.5)
MCV RBC AUTO: 90 FL (ref 78–100)
PLATELET # BLD AUTO: 220 10E3/UL (ref 150–450)
RBC # BLD AUTO: 4.6 10E6/UL (ref 3.8–5.2)
WBC # BLD AUTO: 6 10E3/UL (ref 4–11)

## 2025-01-21 PROCEDURE — 85014 HEMATOCRIT: CPT | Performed by: OBSTETRICS & GYNECOLOGY

## 2025-01-22 ENCOUNTER — VIRTUAL VISIT (OUTPATIENT)
Dept: NEUROLOGY | Facility: CLINIC | Age: 35
End: 2025-01-22
Payer: COMMERCIAL

## 2025-01-22 VITALS — HEIGHT: 62 IN | BODY MASS INDEX: 27.05 KG/M2 | WEIGHT: 147 LBS

## 2025-01-22 DIAGNOSIS — G43.709 CHRONIC MIGRAINE WITHOUT AURA WITHOUT STATUS MIGRAINOSUS, NOT INTRACTABLE: Primary | ICD-10-CM

## 2025-01-22 RX ORDER — ELETRIPTAN HYDROBROMIDE 20 MG/1
20 TABLET, FILM COATED ORAL
Qty: 18 TABLET | Refills: 11 | Status: SHIPPED | OUTPATIENT
Start: 2025-01-22

## 2025-01-22 ASSESSMENT — MIGRAINE DISABILITY ASSESSMENT (MIDAS)
HOW MANY DAYS WAS YOUR PRODUCTIVITY CUT IN HALF BECAUSE OF HEADACHES: 0
HOW OFTEN WERE SOCIAL ACTIVITIES MISSED DUE TO HEADACHES: 0
HOW MANY DAYS WAS HOUSEWORK PRODUCTIVITY CUT IN HALF DUE TO HEADACHES: 2
ON A SCALE FROM 0-10 ON AVERAGE HOW PAINFUL WERE HEADACHES: 6
TOTAL SCORE: 4
HOW MANY DAYS DID YOU NOT DO HOUSEWORK BECAUSE OF HEADACHES: 2
HOW MANY DAYS IN THE PAST 3 MONTHS HAVE YOU HAD A HEADACHE: 15
HOW MANY DAYS DID YOU MISS WORK OR SCHOOL BECAUSE OF HEADACHES: 0

## 2025-01-22 ASSESSMENT — HEADACHE IMPACT TEST (HIT 6)
HOW OFTEN HAVE YOU FELT TOO TIRED TO WORK BECAUSE OF YOUR HEADACHES: SOMETIMES
HIT6 TOTAL SCORE: 62
HOW OFTEN DO HEADACHES LIMIT YOUR DAILY ACTIVITIES: SOMETIMES
WHEN YOU HAVE HEADACHES HOW OFTEN IS THE PAIN SEVERE: SOMETIMES
HOW OFTEN DID HEADACHS LIMIT CONCENTRATION ON WORK OR DAILY ACTIVITY: SOMETIMES
WHEN YOU HAVE A HEADACHE HOW OFTEN DO YOU WISH YOU COULD LIE DOWN: VERY OFTEN
HOW OFTEN HAVE YOU FELT FED UP OR IRRITATED BECAUSE OF YOUR HEADACHES: VERY OFTEN

## 2025-01-22 ASSESSMENT — PAIN SCALES - GENERAL: PAINLEVEL_OUTOF10: MILD PAIN (3)

## 2025-01-22 NOTE — PROGRESS NOTES
"Virtual Visit Details    Type of service:  Video Visit     Originating Location (pt. Location): Home    Distant Location (provider location):  Off-site  Platform used for Video Visit: [facility]    Headache Neurology Progress Note  January 22, 2025      Assessment/Plan:   Tessa Aly is a 34 year old woman who returns for follow-up of chronic migraine. She recently gave birth to twins. Headaches have returned; she is no longer breastfeeding.     For migraine management, I recommend resuming Emgality, starting with a loading dose.  I will reorder Emgality 240 mg loading dose and 120 mg dose every 28 days.     For acute treatment, I recommend a trial of eletriptan 20 mg at the onset of headache, with a repeat dose in 2 hours if needed but should not exceed more than 9 days/month to avoid medication overuse.  -Alternative triptans, or CGRP inhibitor could be considered in the future.     The longitudinal plan of care for Tessa was addressed during this visit. Due to the added complexity in care, I will continue to support Tessa in the subsequent management of this condition(s) and with the ongoing continuity of care of this condition(s). I will see her back in 6 months, or sooner if needed.    America Palma MD  Neurology     Subjective:    Tessa Aly returns for follow up of chronic migraine.    Today, she reports that she is doing okay.    She had her twins.   Headaches were better while pregnant, then returned one week after birth.    Headache similar to past.  No new headache features reported.    Not currently breastfeeding.  She breast-fed for about 2 weeks before stopping.    Objective:    Vitals: Ht 1.575 m (5' 2.01\")   Wt 66.7 kg (147 lb)   BMI 26.88 kg/m    General: Cooperative, NAD  Neurologic:  Mental Status: Fully alert, attentive and oriented. Speech clear and fluent.   Cranial Nerves: Facial movements symmetric.   Motor: No abnormal movements.      Pertinent " Investigations:          11/21/2022    11:16 AM 2/8/2023     8:38 AM 1/22/2025    12:21 PM   HIT-6   When you have headaches, how often is the pain severe 10 10 10   How often do headaches limit your ability to do usual daily activities including household work, work, school, or social activities? 10 10 10   When you have a headache, how often do you wish you could lie down? 13 11 11   In the past 4 weeks, how often have you felt too tired to do work or daily activities because of your headaches 10 10 10   In the past 4 weeks, how often have you felt fed up or irritated because of your headaches 11 11 11   In the past 4 weeks, how often did headaches limit your ability to concentrate on work or daily activities 11 10 10   HIT-6 Total Score 65 62 62        Patient-reported           7/13/2022     8:10 AM 2/8/2023     8:40 AM 1/22/2025    12:22 PM   MIDAS - in the past three months:   On how many days did you miss work or school because of your headaches? 6 0 0   How many days was your productivity at work or school reduced by half or more because of your headaches? 6 12 0   On how many days did you not do household work because of your headaches? 3 0 2   How many days was your productivity in household work reduced by half or more because of your headaches? 0 4 2   On how many days did you miss family, social, or leisure activities because of your headaches? 2 0 0   On how many days did you have a headache? 45 40 15   On a scale of 0-10, on average how painful were these headaches? 6 6 6   MIDAS Score 17 (III - Moderate Disability) 16 (III - Moderate Disability) 4 (I - Little or No Disability)

## 2025-01-22 NOTE — NURSING NOTE
Current patient location: [address] [place] [id number]    Is the patient currently in the state of [place]? YES    Visit mode: VIDEO    If the visit is dropped, the patient can be reconnected by:VIDEO VISIT: Text to cell phone:   Telephone Information:   Mobile [phone]       Will anyone else be joining the visit? NO  (If patient encounters technical issues they should call [phone]    Are changes needed to the allergy or medication list? No    Are refills needed on medications prescribed by this physician? Discuss with provider    Rooming Documentation:  Questionnaire(s) completed    Reason for visit: Follow Up    Fifi CROWLEY

## 2025-01-22 NOTE — LETTER
1/22/2025       RE: Tessa Aly  [address] [facility] [address]     Dear Colleague,    Thank you for referring your patient, Tessa Aly, to the [facility] NEUROLOGY CLINIC [facility]. Please see a copy of my visit note below.    Virtual Visit Details    Type of service:  Video Visit     Originating Location (pt. Location): Home    Distant Location (provider location):  Off-site  Platform used for Video Visit: [facility]    Headache Neurology Progress Note  January 22, 2025      Assessment/Plan:   Tessa Aly is a 34 year old woman who returns for follow-up of chronic migraine. She recently gave birth to twins. Headaches have returned; she is no longer breastfeeding.     For migraine management, I recommend resuming Emgality, starting with a loading dose.  I will reorder Emgality 240 mg loading dose and 120 mg dose every 28 days.     For acute treatment, I recommend a trial of eletriptan 20 mg at the onset of headache, with a repeat dose in 2 hours if needed but should not exceed more than 9 days/month to avoid medication overuse.  -Alternative triptans, or CGRP inhibitor could be considered in the future.     The longitudinal plan of care for Tessa was addressed during this visit. Due to the added complexity in care, I will continue to support Tessa in the subsequent management of this condition(s) and with the ongoing continuity of care of this condition(s). I will see her back in 6 months, or sooner if needed.    America Palma MD  Neurology     Subjective:    Tessa Aly returns for follow up of chronic migraine.    Today, she reports that she is doing okay.    She had her twins.   Headaches were better while pregnant, then returned one week after birth.    Headache similar to past.  No new headache features reported.    Not currently breastfeeding.  She breast-fed for about 2 weeks before  "stopping.    Objective:    Vitals: Ht 1.575 m (5' 2.01\")   Wt 66.7 kg (147 lb)   BMI 26.88 kg/m    General: Cooperative, NAD  Neurologic:  Mental Status: Fully alert, attentive and oriented. Speech clear and fluent.   Cranial Nerves: Facial movements symmetric.   Motor: No abnormal movements.      Pertinent Investigations:          11/21/2022    11:16 AM 2/8/2023     8:38 AM 1/22/2025    12:21 PM   HIT-6   When you have headaches, how often is the pain severe 10 10 10   How often do headaches limit your ability to do usual daily activities including household work, work, school, or social activities? 10 10 10   When you have a headache, how often do you wish you could lie down? 13 11 11   In the past 4 weeks, how often have you felt too tired to do work or daily activities because of your headaches 10 10 10   In the past 4 weeks, how often have you felt fed up or irritated because of your headaches 11 11 11   In the past 4 weeks, how often did headaches limit your ability to concentrate on work or daily activities 11 10 10   HIT-6 Total Score 65 62 62        Patient-reported           7/13/2022     8:10 AM 2/8/2023     8:40 AM 1/22/2025    12:22 PM   MIDAS - in the past three months:   On how many days did you miss work or school because of your headaches? 6 0 0   How many days was your productivity at work or school reduced by half or more because of your headaches? 6 12 0   On how many days did you not do household work because of your headaches? 3 0 2   How many days was your productivity in household work reduced by half or more because of your headaches? 0 4 2   On how many days did you miss family, social, or leisure activities because of your headaches? 2 0 0   On how many days did you have a headache? 45 40 15   On a scale of 0-10, on average how painful were these headaches? 6 6 6   MIDAS Score 17 (III - Moderate Disability) 16 (III - Moderate Disability) 4 (I - Little or No Disability)            Again, " thank you for allowing me to participate in the care of your patient.      Sincerely,    America Palma MD

## 2025-01-23 ENCOUNTER — TELEPHONE (OUTPATIENT)
Dept: NEUROLOGY | Facility: CLINIC | Age: 35
End: 2025-01-23
Payer: COMMERCIAL

## 2025-01-23 NOTE — TELEPHONE ENCOUNTER
Patient confirmed scheduled appointment:  Date: 7/23/25  Time: 1pm  Visit type: Return Headache  Provider: Minerva  Location: Virtual  Testing/imaging: NA  Additional notes: 6 month follow up    Lynette Mayer on 1/23/2025 at 4:42 PM

## 2025-07-19 ENCOUNTER — HEALTH MAINTENANCE LETTER (OUTPATIENT)
Age: 35
End: 2025-07-19

## 2025-07-23 ENCOUNTER — VIRTUAL VISIT (OUTPATIENT)
Dept: NEUROLOGY | Facility: CLINIC | Age: 35
End: 2025-07-23
Payer: COMMERCIAL

## 2025-07-23 VITALS — BODY MASS INDEX: 25.4 KG/M2 | HEIGHT: 62 IN | WEIGHT: 138 LBS

## 2025-07-23 DIAGNOSIS — G43.709 CHRONIC MIGRAINE WITHOUT AURA WITHOUT STATUS MIGRAINOSUS, NOT INTRACTABLE: ICD-10-CM

## 2025-07-23 PROCEDURE — 98005 SYNCH AUDIO-VIDEO EST LOW 20: CPT | Performed by: PSYCHIATRY & NEUROLOGY

## 2025-07-23 PROCEDURE — G2211 COMPLEX E/M VISIT ADD ON: HCPCS | Mod: 95 | Performed by: PSYCHIATRY & NEUROLOGY

## 2025-07-23 PROCEDURE — 1126F AMNT PAIN NOTED NONE PRSNT: CPT | Mod: 95 | Performed by: PSYCHIATRY & NEUROLOGY

## 2025-07-23 RX ORDER — ELETRIPTAN HYDROBROMIDE 20 MG/1
20 TABLET, FILM COATED ORAL
Qty: 18 TABLET | Refills: 11 | Status: SHIPPED | OUTPATIENT
Start: 2025-07-23

## 2025-07-23 ASSESSMENT — MIGRAINE DISABILITY ASSESSMENT (MIDAS)
ON A SCALE FROM 0-10 ON AVERAGE HOW PAINFUL WERE HEADACHES: 4
HOW OFTEN WERE SOCIAL ACTIVITIES MISSED DUE TO HEADACHES: 0
HOW MANY DAYS WAS HOUSEWORK PRODUCTIVITY CUT IN HALF DUE TO HEADACHES: 0
HOW MANY DAYS IN THE PAST 3 MONTHS HAVE YOU HAD A HEADACHE: 0
HOW MANY DAYS DID YOU MISS WORK OR SCHOOL BECAUSE OF HEADACHES: 0
TOTAL SCORE: 0
HOW MANY DAYS DID YOU NOT DO HOUSEWORK BECAUSE OF HEADACHES: 0
HOW MANY DAYS WAS YOUR PRODUCTIVITY CUT IN HALF BECAUSE OF HEADACHES: 0

## 2025-07-23 ASSESSMENT — HEADACHE IMPACT TEST (HIT 6)
HOW OFTEN DO HEADACHES LIMIT YOUR DAILY ACTIVITIES: RARELY
HOW OFTEN HAVE YOU FELT FED UP OR IRRITATED BECAUSE OF YOUR HEADACHES: NEVER
HOW OFTEN HAVE YOU FELT TOO TIRED TO WORK BECAUSE OF YOUR HEADACHES: NEVER
HIT6 TOTAL SCORE: 46
WHEN YOU HAVE A HEADACHE HOW OFTEN DO YOU WISH YOU COULD LIE DOWN: SOMETIMES
WHEN YOU HAVE HEADACHES HOW OFTEN IS THE PAIN SEVERE: SOMETIMES
HOW OFTEN DID HEADACHS LIMIT CONCENTRATION ON WORK OR DAILY ACTIVITY: NEVER

## 2025-07-23 ASSESSMENT — PAIN SCALES - GENERAL: PAINLEVEL_OUTOF10: NO PAIN (0)

## 2025-07-23 NOTE — LETTER
7/23/2025       RE: Tessa Aly  [address] [facility] [address]     Dear Colleague,    Thank you for referring your patient, Tessa Aly, to the [facility] NEUROLOGY CLINIC [facility]. Please see a copy of my visit note below.    Virtual Visit Details    Type of service:  Video Visit     Originating Location (pt. Location): Home    Distant Location (provider location):  Off-site  Platform used for Video Visit: [facility]    Headache Neurology Progress Note  July 23, 2025      Assessment/Plan:   Tessa Aly is a 34 year old woman who returns for follow-up of chronic migraine. Headaches are improved on Emgality every 28 days. Excedrin and eletriptan 20 mg are helpful as needed. We will continue this plan.     For migraine management, I recommend continuing Emgality.     For acute treatment, I recommend eletriptan 20 mg at the onset of headache, with a repeat dose in 2 hours if needed but should not exceed more than 9 days/month to avoid medication overuse.  -Ok to continue Excedrin as needed, no more than 9 days per month.  -Alternative triptans, or CGRP inhibitor could be considered in the future.     The longitudinal plan of care for Tessa was addressed during this visit. Due to the added complexity in care, I will continue to support Tessa in the subsequent management of this condition(s) and with the ongoing continuity of care of this condition(s). Follow up in 6-12 months.    America Palma MD  Neurology     Subjective:    Tessa Aly returns for follow up of chronic migraine.    No changes in headache quality.    Headaches are less frequent and less severe since starting Emgality.    She restarted Emgality, without side effects.    Excedrin migraine or eletriptan 20 mg are helpful as needed. Eletriptan can cause fatigue.    Twins are 7 months old. Doing well, almost crawling.    Objective:  "   Vitals: Ht 1.575 m (5' 2\")   Wt 62.6 kg (138 lb)   BMI 25.24 kg/m    General: Cooperative, NAD  Neurologic:  Mental Status: Fully alert, attentive and oriented. Speech clear and fluent.   Cranial Nerves: Facial movements symmetric.   Motor: No abnormal movements.      Pertinent Investigations:          2/8/2023     8:38 AM 1/22/2025    12:21 PM 7/23/2025    12:36 PM   HIT-6   When you have headaches, how often is the pain severe 10 10 10   How often do headaches limit your ability to do usual daily activities including household work, work, school, or social activities? 10 10 8   When you have a headache, how often do you wish you could lie down? 11 11 10   In the past 4 weeks, how often have you felt too tired to do work or daily activities because of your headaches 10 10 6   In the past 4 weeks, how often have you felt fed up or irritated because of your headaches 11 11 6   In the past 4 weeks, how often did headaches limit your ability to concentrate on work or daily activities 10 10 6   HIT-6 Total Score 62 62  46        Patient-reported           2/8/2023     8:40 AM 1/22/2025    12:22 PM 7/23/2025    12:37 PM   MIDAS - in the past three months:   On how many days did you miss work or school because of your headaches? 0 0 0   How many days was your productivity at work or school reduced by half or more because of your headaches? 12 0 0   On how many days did you not do household work because of your headaches? 0 2 0   How many days was your productivity in household work reduced by half or more because of your headaches? 4 2 0   On how many days did you miss family, social, or leisure activities because of your headaches? 0 0 0   On how many days did you have a headache? 40 15 0   On a scale of 0-10, on average how painful were these headaches? 6 6 4   MIDAS Score 16 (III - Moderate Disability) 4 (I - Little or No Disability) 0 (I - Little or No Disability)          Again, thank you for allowing me to " participate in the care of your patient.      Sincerely,    America Palma MD

## 2025-07-23 NOTE — NURSING NOTE
Current patient location: [address] [place] [id number]    Is the patient currently in the state of [place]? YES    Visit mode: VIDEO    If the visit is dropped, the patient can be reconnected by:VIDEO VISIT: Text to cell phone:   Telephone Information:   Mobile [phone]       Will anyone else be joining the visit? NO  (If patient encounters technical issues they should call [phone]    Are changes needed to the allergy or medication list? Pt stated no changes to allergies and Pt stated no med changes    Are refills needed on medications prescribed by this physician? NO    Rooming Documentation:  Questionnaire(s) completed    Reason for visit: ONELIA IYERF

## 2025-07-23 NOTE — PROGRESS NOTES
"Virtual Visit Details    Type of service:  Video Visit     Originating Location (pt. Location): Home    Distant Location (provider location):  Off-site  Platform used for Video Visit: [facility]    Headache Neurology Progress Note  July 23, 2025      Assessment/Plan:   Tessa Aly is a 34 year old woman who returns for follow-up of chronic migraine. Headaches are improved on Emgality every 28 days. Excedrin and eletriptan 20 mg are helpful as needed. We will continue this plan.     For migraine management, I recommend continuing Emgality.     For acute treatment, I recommend eletriptan 20 mg at the onset of headache, with a repeat dose in 2 hours if needed but should not exceed more than 9 days/month to avoid medication overuse.  -Ok to continue Excedrin as needed, no more than 9 days per month.  -Alternative triptans, or CGRP inhibitor could be considered in the future.     The longitudinal plan of care for Tessa was addressed during this visit. Due to the added complexity in care, I will continue to support Tessa in the subsequent management of this condition(s) and with the ongoing continuity of care of this condition(s). Follow up in 6-12 months.    America Palma MD  Neurology     Subjective:    Tessa Aly returns for follow up of chronic migraine.    No changes in headache quality.    Headaches are less frequent and less severe since starting Emgality.    She restarted Emgality, without side effects.    Excedrin migraine or eletriptan 20 mg are helpful as needed. Eletriptan can cause fatigue.    Twins are 7 months old. Doing well, almost crawling.    Objective:    Vitals: Ht 1.575 m (5' 2\")   Wt 62.6 kg (138 lb)   BMI 25.24 kg/m    General: Cooperative, NAD  Neurologic:  Mental Status: Fully alert, attentive and oriented. Speech clear and fluent.   Cranial Nerves: Facial movements symmetric.   Motor: No abnormal movements.      Pertinent Investigations:          " 2/8/2023     8:38 AM 1/22/2025    12:21 PM 7/23/2025    12:36 PM   HIT-6   When you have headaches, how often is the pain severe 10 10 10   How often do headaches limit your ability to do usual daily activities including household work, work, school, or social activities? 10 10 8   When you have a headache, how often do you wish you could lie down? 11 11 10   In the past 4 weeks, how often have you felt too tired to do work or daily activities because of your headaches 10 10 6   In the past 4 weeks, how often have you felt fed up or irritated because of your headaches 11 11 6   In the past 4 weeks, how often did headaches limit your ability to concentrate on work or daily activities 10 10 6   HIT-6 Total Score 62 62  46        Patient-reported           2/8/2023     8:40 AM 1/22/2025    12:22 PM 7/23/2025    12:37 PM   MIDAS - in the past three months:   On how many days did you miss work or school because of your headaches? 0 0 0   How many days was your productivity at work or school reduced by half or more because of your headaches? 12 0 0   On how many days did you not do household work because of your headaches? 0 2 0   How many days was your productivity in household work reduced by half or more because of your headaches? 4 2 0   On how many days did you miss family, social, or leisure activities because of your headaches? 0 0 0   On how many days did you have a headache? 40 15 0   On a scale of 0-10, on average how painful were these headaches? 6 6 4   MIDAS Score 16 (III - Moderate Disability) 4 (I - Little or No Disability) 0 (I - Little or No Disability)

## (undated) DEVICE — SU CHROMIC 1 CT 27" 803H

## (undated) DEVICE — BLADE CLIPPER DISP 4406

## (undated) DEVICE — SUTURE VICRYL+ 2-0 27IN CT-1 UND VCP259H

## (undated) DEVICE — PAD INSERT MED PEACH 14X6.5 62550

## (undated) DEVICE — CUSTOM PACK C-SECTION LHE

## (undated) DEVICE — SU CHROMIC 3-0 SH 27" G122H

## (undated) DEVICE — SOL WATER IRRIG 1000ML BOTTLE 2F7114

## (undated) DEVICE — TUBE BLOOD LAV 4.0ML EDTA PLSTC

## (undated) DEVICE — GLOVE SURG PI ULTRA TOUCH M SZ 7 LF 42670

## (undated) DEVICE — ESU GROUND PAD ADULT REM W/15' CORD E7507DB

## (undated) DEVICE — GLOVE PI ULTRATCH M LF SZ 6.5 PF CUFF TEXT STRL LF 42665

## (undated) DEVICE — SUTURE PDS 0 27IN CT1 + VIOLET PDP340H

## (undated) DEVICE — GOWN IMPERVIOUS BREATHABLE SMART LG 89015

## (undated) DEVICE — SU VICRYL+ 3-0 CT1 36IN UND VCP944H

## (undated) DEVICE — GLOVE UNDER INDICATOR PI SZ 6 LF 41660

## (undated) DEVICE — DRESSING FOAM MEPILEX BORDER AG 12X4 POSTOP 498600

## (undated) DEVICE — PREP CHLORAPREP 26ML TINTED HI-LITE ORANGE 930815

## (undated) DEVICE — PACK MINOR SINGLE BASIN SSK3001

## (undated) DEVICE — SOL NACL 0.9% IRRIG 1000ML BOTTLE 2F7124

## (undated) DEVICE — SUTURE MONOCRYL+ 4-0 PS-2 27IN MCP426H

## (undated) DEVICE — SU DERMABOND ADVANCED .7ML DNX12

## (undated) DEVICE — SUCTION MANIFOLD NEPTUNE 2 SYS 1 PORT 702-025-000

## (undated) DEVICE — GLOVE BIOGEL PI ULTRATOUCH G SZ 6.0 42160